# Patient Record
Sex: FEMALE | Race: WHITE | Employment: OTHER | URBAN - METROPOLITAN AREA
[De-identification: names, ages, dates, MRNs, and addresses within clinical notes are randomized per-mention and may not be internally consistent; named-entity substitution may affect disease eponyms.]

---

## 2018-10-12 ENCOUNTER — HOSPITAL ENCOUNTER (INPATIENT)
Facility: CLINIC | Age: 83
LOS: 1 days | Discharge: HOME | DRG: 065 | End: 2018-10-13
Attending: EMERGENCY MEDICINE | Admitting: INTERNAL MEDICINE
Payer: COMMERCIAL

## 2018-10-12 ENCOUNTER — APPOINTMENT (OUTPATIENT)
Dept: CT IMAGING | Facility: CLINIC | Age: 83
DRG: 065 | End: 2018-10-12
Attending: EMERGENCY MEDICINE
Payer: COMMERCIAL

## 2018-10-12 ENCOUNTER — APPOINTMENT (OUTPATIENT)
Dept: MRI IMAGING | Facility: CLINIC | Age: 83
DRG: 065 | End: 2018-10-12
Attending: NURSE PRACTITIONER
Payer: COMMERCIAL

## 2018-10-12 DIAGNOSIS — E78.2 MIXED HYPERLIPIDEMIA: ICD-10-CM

## 2018-10-12 DIAGNOSIS — E87.6 HYPOKALEMIA: ICD-10-CM

## 2018-10-12 DIAGNOSIS — I63.9 CEREBROVASCULAR ACCIDENT (CVA), UNSPECIFIED MECHANISM (H): ICD-10-CM

## 2018-10-12 DIAGNOSIS — N39.0 URINARY TRACT INFECTION WITHOUT HEMATURIA, SITE UNSPECIFIED: ICD-10-CM

## 2018-10-12 DIAGNOSIS — I63.9 ACUTE ISCHEMIC STROKE (H): Primary | ICD-10-CM

## 2018-10-12 DIAGNOSIS — N30.00 ACUTE CYSTITIS WITHOUT HEMATURIA: ICD-10-CM

## 2018-10-12 LAB
ALBUMIN SERPL-MCNC: 3.3 G/DL (ref 3.4–5)
ALBUMIN UR-MCNC: NEGATIVE MG/DL
ALP SERPL-CCNC: 47 U/L (ref 40–150)
ALT SERPL W P-5'-P-CCNC: 22 U/L (ref 0–50)
AMPHETAMINES UR QL SCN: NEGATIVE
ANION GAP SERPL CALCULATED.3IONS-SCNC: 6 MMOL/L (ref 3–14)
APPEARANCE UR: CLEAR
APTT PPP: 31 SEC (ref 22–37)
AST SERPL W P-5'-P-CCNC: 21 U/L (ref 0–45)
BACTERIA #/AREA URNS HPF: ABNORMAL /HPF
BARBITURATES UR QL: NEGATIVE
BASOPHILS # BLD AUTO: 0 10E9/L (ref 0–0.2)
BASOPHILS NFR BLD AUTO: 0.4 %
BENZODIAZ UR QL: POSITIVE
BILIRUB DIRECT SERPL-MCNC: 0.1 MG/DL (ref 0–0.2)
BILIRUB SERPL-MCNC: 0.5 MG/DL (ref 0.2–1.3)
BILIRUB UR QL STRIP: NEGATIVE
BUN SERPL-MCNC: 16 MG/DL (ref 7–30)
CALCIUM SERPL-MCNC: 8.6 MG/DL (ref 8.5–10.1)
CANNABINOIDS UR QL SCN: NEGATIVE
CHLORIDE SERPL-SCNC: 106 MMOL/L (ref 94–109)
CHOLEST SERPL-MCNC: 265 MG/DL
CO2 SERPL-SCNC: 29 MMOL/L (ref 20–32)
COCAINE UR QL: NEGATIVE
COLOR UR AUTO: ABNORMAL
CREAT SERPL-MCNC: 0.97 MG/DL (ref 0.52–1.04)
DIFFERENTIAL METHOD BLD: NORMAL
EOSINOPHIL # BLD AUTO: 0 10E9/L (ref 0–0.7)
EOSINOPHIL NFR BLD AUTO: 0.6 %
ERYTHROCYTE [DISTWIDTH] IN BLOOD BY AUTOMATED COUNT: 12.7 % (ref 10–15)
ERYTHROCYTE [SEDIMENTATION RATE] IN BLOOD BY WESTERGREN METHOD: 8 MM/H (ref 0–30)
ETHANOL SERPL-MCNC: <0.01 G/DL
GFR SERPL CREATININE-BSD FRML MDRD: 55 ML/MIN/1.7M2
GLUCOSE SERPL-MCNC: 96 MG/DL (ref 70–99)
GLUCOSE UR STRIP-MCNC: NEGATIVE MG/DL
HBA1C MFR BLD: 5.2 % (ref 0–5.6)
HCT VFR BLD AUTO: 37.3 % (ref 35–47)
HDLC SERPL-MCNC: 98 MG/DL
HGB BLD-MCNC: 12.5 G/DL (ref 11.7–15.7)
HGB UR QL STRIP: NEGATIVE
IMM GRANULOCYTES # BLD: 0 10E9/L (ref 0–0.4)
IMM GRANULOCYTES NFR BLD: 0.6 %
INR PPP: 0.92 (ref 0.86–1.14)
INTERPRETATION ECG - MUSE: NORMAL
KETONES UR STRIP-MCNC: NEGATIVE MG/DL
LDLC SERPL CALC-MCNC: 131 MG/DL
LEUKOCYTE ESTERASE UR QL STRIP: ABNORMAL
LYMPHOCYTES # BLD AUTO: 1.6 10E9/L (ref 0.8–5.3)
LYMPHOCYTES NFR BLD AUTO: 23 %
MCH RBC QN AUTO: 30.6 PG (ref 26.5–33)
MCHC RBC AUTO-ENTMCNC: 33.5 G/DL (ref 31.5–36.5)
MCV RBC AUTO: 91 FL (ref 78–100)
MONOCYTES # BLD AUTO: 0.6 10E9/L (ref 0–1.3)
MONOCYTES NFR BLD AUTO: 8.6 %
MUCOUS THREADS #/AREA URNS LPF: PRESENT /LPF
NEUTROPHILS # BLD AUTO: 4.5 10E9/L (ref 1.6–8.3)
NEUTROPHILS NFR BLD AUTO: 66.8 %
NITRATE UR QL: NEGATIVE
NONHDLC SERPL-MCNC: 167 MG/DL
NRBC # BLD AUTO: 0 10*3/UL
NRBC BLD AUTO-RTO: 0 /100
OPIATES UR QL SCN: NEGATIVE
PCP UR QL SCN: NEGATIVE
PH UR STRIP: 7 PH (ref 5–7)
PLATELET # BLD AUTO: 186 10E9/L (ref 150–450)
POTASSIUM SERPL-SCNC: 3.3 MMOL/L (ref 3.4–5.3)
PROT SERPL-MCNC: 6.8 G/DL (ref 6.8–8.8)
RBC # BLD AUTO: 4.09 10E12/L (ref 3.8–5.2)
RBC #/AREA URNS AUTO: 1 /HPF (ref 0–2)
SODIUM SERPL-SCNC: 141 MMOL/L (ref 133–144)
SOURCE: ABNORMAL
SP GR UR STRIP: 1.03 (ref 1–1.03)
SQUAMOUS #/AREA URNS AUTO: <1 /HPF (ref 0–1)
TRIGL SERPL-MCNC: 182 MG/DL
UROBILINOGEN UR STRIP-MCNC: NORMAL MG/DL (ref 0–2)
WBC # BLD AUTO: 6.8 10E9/L (ref 4–11)
WBC #/AREA URNS AUTO: 32 /HPF (ref 0–5)
WBC CLUMPS #/AREA URNS HPF: PRESENT /HPF

## 2018-10-12 PROCEDURE — 83036 HEMOGLOBIN GLYCOSYLATED A1C: CPT | Performed by: EMERGENCY MEDICINE

## 2018-10-12 PROCEDURE — 87186 SC STD MICRODIL/AGAR DIL: CPT | Performed by: INTERNAL MEDICINE

## 2018-10-12 PROCEDURE — 85025 COMPLETE CBC W/AUTO DIFF WBC: CPT | Performed by: EMERGENCY MEDICINE

## 2018-10-12 PROCEDURE — 87086 URINE CULTURE/COLONY COUNT: CPT | Performed by: INTERNAL MEDICINE

## 2018-10-12 PROCEDURE — 70496 CT ANGIOGRAPHY HEAD: CPT | Mod: XS

## 2018-10-12 PROCEDURE — 25000128 H RX IP 250 OP 636: Performed by: EMERGENCY MEDICINE

## 2018-10-12 PROCEDURE — 70450 CT HEAD/BRAIN W/O DYE: CPT

## 2018-10-12 PROCEDURE — 25000125 ZZHC RX 250: Performed by: EMERGENCY MEDICINE

## 2018-10-12 PROCEDURE — 99285 EMERGENCY DEPT VISIT HI MDM: CPT | Mod: 25

## 2018-10-12 PROCEDURE — 80320 DRUG SCREEN QUANTALCOHOLS: CPT | Performed by: EMERGENCY MEDICINE

## 2018-10-12 PROCEDURE — 93005 ELECTROCARDIOGRAM TRACING: CPT

## 2018-10-12 PROCEDURE — 85610 PROTHROMBIN TIME: CPT | Performed by: EMERGENCY MEDICINE

## 2018-10-12 PROCEDURE — 99223 1ST HOSP IP/OBS HIGH 75: CPT | Mod: AI | Performed by: INTERNAL MEDICINE

## 2018-10-12 PROCEDURE — 80061 LIPID PANEL: CPT | Performed by: EMERGENCY MEDICINE

## 2018-10-12 PROCEDURE — 80048 BASIC METABOLIC PNL TOTAL CA: CPT | Performed by: EMERGENCY MEDICINE

## 2018-10-12 PROCEDURE — 87088 URINE BACTERIA CULTURE: CPT | Performed by: INTERNAL MEDICINE

## 2018-10-12 PROCEDURE — 40000916 ZZH STATISTIC SITTER, NIGHT HOURS

## 2018-10-12 PROCEDURE — 85730 THROMBOPLASTIN TIME PARTIAL: CPT | Performed by: EMERGENCY MEDICINE

## 2018-10-12 PROCEDURE — 70551 MRI BRAIN STEM W/O DYE: CPT

## 2018-10-12 PROCEDURE — 96375 TX/PRO/DX INJ NEW DRUG ADDON: CPT

## 2018-10-12 PROCEDURE — 12000007 ZZH R&B INTERMEDIATE

## 2018-10-12 PROCEDURE — 25000128 H RX IP 250 OP 636: Performed by: INTERNAL MEDICINE

## 2018-10-12 PROCEDURE — 25000128 H RX IP 250 OP 636

## 2018-10-12 PROCEDURE — 96361 HYDRATE IV INFUSION ADD-ON: CPT

## 2018-10-12 PROCEDURE — 70498 CT ANGIOGRAPHY NECK: CPT

## 2018-10-12 PROCEDURE — 80076 HEPATIC FUNCTION PANEL: CPT | Performed by: EMERGENCY MEDICINE

## 2018-10-12 PROCEDURE — 96374 THER/PROPH/DIAG INJ IV PUSH: CPT

## 2018-10-12 PROCEDURE — 80307 DRUG TEST PRSMV CHEM ANLYZR: CPT | Performed by: EMERGENCY MEDICINE

## 2018-10-12 PROCEDURE — 81001 URINALYSIS AUTO W/SCOPE: CPT | Performed by: EMERGENCY MEDICINE

## 2018-10-12 PROCEDURE — 85652 RBC SED RATE AUTOMATED: CPT | Performed by: EMERGENCY MEDICINE

## 2018-10-12 PROCEDURE — 40000915 ZZH STATISTIC SITTER, EVENING HOURS

## 2018-10-12 PROCEDURE — 99222 1ST HOSP IP/OBS MODERATE 55: CPT | Performed by: PSYCHIATRY & NEUROLOGY

## 2018-10-12 RX ORDER — BUDESONIDE 3 MG/1
9 CAPSULE, COATED PELLETS ORAL EVERY MORNING
COMMUNITY

## 2018-10-12 RX ORDER — LORAZEPAM 2 MG/ML
INJECTION INTRAMUSCULAR
Status: COMPLETED
Start: 2018-10-12 | End: 2018-10-12

## 2018-10-12 RX ORDER — LABETALOL HYDROCHLORIDE 5 MG/ML
10-40 INJECTION, SOLUTION INTRAVENOUS EVERY 10 MIN PRN
Status: DISCONTINUED | OUTPATIENT
Start: 2018-10-12 | End: 2018-10-13 | Stop reason: HOSPADM

## 2018-10-12 RX ORDER — PROCHLORPERAZINE MALEATE 5 MG
5 TABLET ORAL EVERY 6 HOURS PRN
Status: DISCONTINUED | OUTPATIENT
Start: 2018-10-12 | End: 2018-10-13 | Stop reason: HOSPADM

## 2018-10-12 RX ORDER — POTASSIUM CL/LIDO/0.9 % NACL 10MEQ/0.1L
10 INTRAVENOUS SOLUTION, PIGGYBACK (ML) INTRAVENOUS
Status: DISCONTINUED | OUTPATIENT
Start: 2018-10-12 | End: 2018-10-13 | Stop reason: HOSPADM

## 2018-10-12 RX ORDER — HYDRALAZINE HYDROCHLORIDE 20 MG/ML
10-20 INJECTION INTRAMUSCULAR; INTRAVENOUS
Status: DISCONTINUED | OUTPATIENT
Start: 2018-10-12 | End: 2018-10-13 | Stop reason: HOSPADM

## 2018-10-12 RX ORDER — ONDANSETRON 4 MG/1
4 TABLET, ORALLY DISINTEGRATING ORAL EVERY 6 HOURS PRN
Status: DISCONTINUED | OUTPATIENT
Start: 2018-10-12 | End: 2018-10-13 | Stop reason: HOSPADM

## 2018-10-12 RX ORDER — LORAZEPAM 2 MG/ML
1 INJECTION INTRAMUSCULAR ONCE
Status: COMPLETED | OUTPATIENT
Start: 2018-10-12 | End: 2018-10-12

## 2018-10-12 RX ORDER — POTASSIUM CHLORIDE 29.8 MG/ML
20 INJECTION INTRAVENOUS
Status: DISCONTINUED | OUTPATIENT
Start: 2018-10-12 | End: 2018-10-13 | Stop reason: HOSPADM

## 2018-10-12 RX ORDER — IOPAMIDOL 755 MG/ML
120 INJECTION, SOLUTION INTRAVASCULAR ONCE
Status: COMPLETED | OUTPATIENT
Start: 2018-10-12 | End: 2018-10-12

## 2018-10-12 RX ORDER — POTASSIUM CHLORIDE 1.5 G/1.58G
20-40 POWDER, FOR SOLUTION ORAL
Status: DISCONTINUED | OUTPATIENT
Start: 2018-10-12 | End: 2018-10-13 | Stop reason: HOSPADM

## 2018-10-12 RX ORDER — ACETAMINOPHEN 325 MG/1
650 TABLET ORAL EVERY 4 HOURS PRN
Status: DISCONTINUED | OUTPATIENT
Start: 2018-10-12 | End: 2018-10-13 | Stop reason: HOSPADM

## 2018-10-12 RX ORDER — POTASSIUM CHLORIDE 7.45 MG/ML
10 INJECTION INTRAVENOUS
Status: DISCONTINUED | OUTPATIENT
Start: 2018-10-12 | End: 2018-10-13 | Stop reason: HOSPADM

## 2018-10-12 RX ORDER — AMOXICILLIN 250 MG
1 CAPSULE ORAL 2 TIMES DAILY PRN
Status: DISCONTINUED | OUTPATIENT
Start: 2018-10-12 | End: 2018-10-13 | Stop reason: HOSPADM

## 2018-10-12 RX ORDER — NALOXONE HYDROCHLORIDE 0.4 MG/ML
.1-.4 INJECTION, SOLUTION INTRAMUSCULAR; INTRAVENOUS; SUBCUTANEOUS
Status: DISCONTINUED | OUTPATIENT
Start: 2018-10-12 | End: 2018-10-13 | Stop reason: HOSPADM

## 2018-10-12 RX ORDER — SODIUM CHLORIDE 9 MG/ML
INJECTION, SOLUTION INTRAVENOUS CONTINUOUS
Status: DISCONTINUED | OUTPATIENT
Start: 2018-10-12 | End: 2018-10-13

## 2018-10-12 RX ORDER — SODIUM CHLORIDE 9 MG/ML
1000 INJECTION, SOLUTION INTRAVENOUS CONTINUOUS
Status: DISCONTINUED | OUTPATIENT
Start: 2018-10-12 | End: 2018-10-12

## 2018-10-12 RX ORDER — CEFTRIAXONE 1 G/1
1 INJECTION, POWDER, FOR SOLUTION INTRAMUSCULAR; INTRAVENOUS EVERY 24 HOURS
Status: DISCONTINUED | OUTPATIENT
Start: 2018-10-13 | End: 2018-10-13 | Stop reason: HOSPADM

## 2018-10-12 RX ORDER — POTASSIUM CHLORIDE 1500 MG/1
20-40 TABLET, EXTENDED RELEASE ORAL
Status: DISCONTINUED | OUTPATIENT
Start: 2018-10-12 | End: 2018-10-13 | Stop reason: HOSPADM

## 2018-10-12 RX ORDER — CEFTRIAXONE 1 G/1
1 INJECTION, POWDER, FOR SOLUTION INTRAMUSCULAR; INTRAVENOUS ONCE
Status: COMPLETED | OUTPATIENT
Start: 2018-10-12 | End: 2018-10-12

## 2018-10-12 RX ORDER — ALPRAZOLAM 0.25 MG
0.25 TABLET ORAL 2 TIMES DAILY PRN
Status: ON HOLD | COMMUNITY
End: 2018-10-13

## 2018-10-12 RX ORDER — ONDANSETRON 2 MG/ML
4 INJECTION INTRAMUSCULAR; INTRAVENOUS EVERY 6 HOURS PRN
Status: DISCONTINUED | OUTPATIENT
Start: 2018-10-12 | End: 2018-10-13 | Stop reason: HOSPADM

## 2018-10-12 RX ORDER — PROCHLORPERAZINE 25 MG
12.5 SUPPOSITORY, RECTAL RECTAL EVERY 12 HOURS PRN
Status: DISCONTINUED | OUTPATIENT
Start: 2018-10-12 | End: 2018-10-13 | Stop reason: HOSPADM

## 2018-10-12 RX ORDER — ROSUVASTATIN CALCIUM 20 MG/1
20 TABLET, COATED ORAL DAILY
Status: DISCONTINUED | OUTPATIENT
Start: 2018-10-12 | End: 2018-10-13 | Stop reason: HOSPADM

## 2018-10-12 RX ORDER — AMOXICILLIN 250 MG
2 CAPSULE ORAL 2 TIMES DAILY PRN
Status: DISCONTINUED | OUTPATIENT
Start: 2018-10-12 | End: 2018-10-13 | Stop reason: HOSPADM

## 2018-10-12 RX ADMIN — SODIUM CHLORIDE 1000 ML: 9 INJECTION, SOLUTION INTRAVENOUS at 11:40

## 2018-10-12 RX ADMIN — LORAZEPAM 1 MG: 2 INJECTION INTRAMUSCULAR at 12:14

## 2018-10-12 RX ADMIN — IOPAMIDOL 120 ML: 755 INJECTION, SOLUTION INTRAVENOUS at 10:55

## 2018-10-12 RX ADMIN — CEFTRIAXONE 1 G: 1 INJECTION, POWDER, FOR SOLUTION INTRAMUSCULAR; INTRAVENOUS at 13:11

## 2018-10-12 RX ADMIN — SODIUM CHLORIDE, PRESERVATIVE FREE 100 ML: 5 INJECTION INTRAVENOUS at 10:55

## 2018-10-12 RX ADMIN — LORAZEPAM 1 MG: 2 INJECTION INTRAMUSCULAR; INTRAVENOUS at 11:52

## 2018-10-12 RX ADMIN — LORAZEPAM 1 MG: 2 INJECTION INTRAMUSCULAR at 11:52

## 2018-10-12 RX ADMIN — SODIUM CHLORIDE: 9 INJECTION, SOLUTION INTRAVENOUS at 15:18

## 2018-10-12 RX ADMIN — LORAZEPAM 1 MG: 2 INJECTION INTRAMUSCULAR; INTRAVENOUS at 12:14

## 2018-10-12 ASSESSMENT — ACTIVITIES OF DAILY LIVING (ADL)
BATHING: 0-->INDEPENDENT
AMBULATION: 0-->INDEPENDENT
COGNITION: 0 - NO COGNITION ISSUES REPORTED
FALL_HISTORY_WITHIN_LAST_SIX_MONTHS: YES
WHICH_OF_THE_ABOVE_FUNCTIONAL_RISKS_HAD_A_RECENT_ONSET_OR_CHANGE?: COMMUNICATION/SPEECH;COGNITION
TOILETING: 0-->INDEPENDENT
NUMBER_OF_TIMES_PATIENT_HAS_FALLEN_WITHIN_LAST_SIX_MONTHS: 1
TRANSFERRING: 0-->INDEPENDENT
SWALLOWING: 0-->SWALLOWS FOODS/LIQUIDS WITHOUT DIFFICULTY
RETIRED_COMMUNICATION: 2-->DIFFICULTY UNDERSTANDING (NOT RELATED TO LANGUAGE BARRIER)
ADLS_ACUITY_SCORE: 17
ADLS_ACUITY_SCORE: 13
DRESS: 0-->INDEPENDENT
RETIRED_EATING: 0-->INDEPENDENT

## 2018-10-12 ASSESSMENT — ENCOUNTER SYMPTOMS
ABDOMINAL PAIN: 0
CONFUSION: 1
SPEECH DIFFICULTY: 1
HEADACHES: 0

## 2018-10-12 NOTE — H&P
Admitted:     10/12/2018      PRIMARY CARE PHYSICIAN:  Dr. Bartolo Dawkins.      CHIEF COMPLAINT:  Abnormal speech.      HISTORY OF PRESENT ILLNESS:  Ms. Huong Lopez is a delightful 85-year-old  lady with a past medical history notable for hypertension, dyslipidemia, depression/anxiety, and sensorineural hearing loss, who has presented with abnormal speech.  Reportedly, her daughter last spoke to her at 1600 hours yesterday and called the patient again this morning noting that she had slurred speech.  Daughter called 911.  Upon arrival, EMS noted slurred speech and confusion.  There was no focal weakness on examination.  EMS also found a bottle of alprazolam in the patient's kitchen from 09/04/2018, ordered twice daily as needed, and a 60 quantity.  She was subsequently transported to the Emergency Department at [facility].  In the ER, the patient has incoherent speech and not able to follow any commands.  She appears to be confused.  There is no report of fever, chills, headache, double vision, blurry vision or other complaints.  In the Emergency Department, the patient has been evaluated by Dr. Camden Tucker, the ER attending physician.  The electrocardiogram reveals normal sinus rhythm, a rate of 87 beats per minute, first-degree AV block, prolonged QTc of 510 and poor R-wave progression in anterior leads.  CT head without contrast shows diffuse cerebral volume loss and cerebral white matter changes consistent with chronic small vessel ischemic disease.  CT with contrast is a normal CT perfusion of the brain.  CT angiography of the head and neck shows minimal calcified atherosclerotic plaque of the proximal and distal internal carotid arteries on both sides that does not result in any stenosis.  Chemistry profile is significant for a slightly low potassium level of 3.3.  Blood glucose is 96 and normal. She has a normal white count of 6.8 and hemoglobin of 12.5 on hematology profile.   Urinalysis is abnormal showing moderate leukocyte esterase, 32 WBCs and a few bacteria.  U-tox is positive for benzodiazepines.  Code stroke was activated in the Emergency Department and the patient was evaluated by Neurology Service.  The patient is having rapid MRI done before admission to the hospital to decide whether she would benefit from tPA treatment.  The Hospitalist Service was called for admission.      PAST MEDICAL HISTORY:   1.  Hypertension.   2.  Dyslipidemia.   3.  Depression/anxiety.   4.  Diverticulosis.   5.  Osteopenia.   6.  Sensorineural hearing loss.   7.  Alcohol abuse, in remission.      PAST SURGICAL HISTORY:   1.  Total abdominal hysterectomy in 1985.   2.  Appendectomy in 1937.   3.  Retinal detachment repair in 12/2012.   4.  Cataract removal in 10/2013.      FAMILY HISTORY:  Significant for coronary artery disease and diabetes in her father, stroke and coronary disease in her brother, coronary artery disease, colon and stomach cancers in her sisters.     SOCIAL HISTORY:  The patient is .  She lives independently.  She does not use any assistive device for ambulation.  She is not a smoker.  She drinks alcohol on occasions.      MEDICATIONS:     Prior to Admission Medications   Prescriptions Last Dose Informant Patient Reported? Taking?   ALPRAZolam (XANAX) 0.25 MG tablet   Yes No   Sig: Take 0.25 mg by mouth 2 times daily as needed for anxiety   LISINOPRIL PO   Yes No   Sig: Take 20 mg by mouth daily    calcium carbonate (OS-FELA 500 MG [place]. [place]) 500 MG tablet   Yes No   Sig: Take 500 mg by mouth 2 times daily.   multivitamin, therapeutic with minerals (MULTI-VITAMIN) TABS   Yes No   Sig: Take 1 tablet by mouth daily.      Facility-Administered Medications: None        ALLERGIES AND  INTOLERANCES:  AUGMENTIN AND SULFA DRUGS WITH A REACTION OF HIVES.      REVIEW OF SYSTEMS:  A 10-point review of system was quite limited.  The patient has incoherent speech and confusion.  It was  reviewed as much as possible in the history of present illness.      PHYSICAL EXAMINATION:   GENERAL:  This patient is a very pleasant elderly lady who is in no acute distress.   VITAL SIGNS:  Blood pressure 123/88, heart rate 92, respiratory rate 22, temperature 98.1 degrees Fahrenheit, oxygen saturation 95% on room air.   HEENT:  The pupils are round, equal, and reactive to light bilaterally.  There is no conjunctival pallor or scleral icterus.  The oral mucosa appears moist.   NECK:  Supple.  There is no elevated JVP.   LUNGS:  Clear to auscultation bilaterally.   CARDIOVASCULAR:  There is a normal S1 and S2 with a regular rate and rhythm.   ABDOMEN:  Soft, nontender, nondistended.  Bowel sounds are present.   EXTREMITIES:  There is no calf tenderness or lower extremity edema.   SKIN:  There is no jaundice, cyanosis or acute rash.   NEUROLOGIC:  She is awake, but not alert or oriented.  Her speech is garbled and incoherent.  She is able to move her 4 extremities.  There is no facial droop.  She is not able to follow commands.  The gait was not tested.      LABORATORY DATA:   1.  Chemistry profile:  Sodium 141, potassium 3.3, BUN 16, creatinine 0.95, calcium 8.6, glucose 96.   2.  Hematology profile:  White count 6.8, hemoglobin 12.5, platelet count 186,000, MCV 91 with differential of 66.8% neutrophils.   3.  Coagulation profile:  INR 0.92, PTT 31.      ASSESSMENT AND PLAN:  Ms. Huong Lopez is a delightful 85-year-old  lady with a past medical history notable for hypertension, dyslipidemia, depression/anxiety, osteopenia, sensorineural hearing loss and alcohol abuse in remission, who has presented with abnormal speech and confusion.      1.  Acute ischemic stroke versus acute toxic encephalopathy due to benzodiazepine overdose:  Her daughter last spoke to her at 1600 hours yesterday and called the patient again this morning noting slurred speech.  Daughter called 911.  Upon arrival, EMS noted slurred  speech and confusion.  EMS also found a bottle of alprazolam from 09/04/2018, ordered twice a day as needed.  The workup in the ER shows a U-tox positive for benzodiazepines.  CT head with and without contrast and CT angio of the head and neck are essentially unremarkable.  Electrocardiogram reveals normal sinus rhythm, first-degree AV block, prolonged QTc of 510 and poor R-wave progression in anterior leads.  Chemistry and hematology profile are essentially unremarkable.  Code stroke was activated in the Emergency Department and the patient has been evaluated by the Neurology Service.  Plan as below.    A.  Rapid MRI to determine whether patient would benefit from tPA.    B.  N.p.o.   C.  IV fluid at a rate of 100 mL per hour.   D.  Neuro checks every 4 hours and p.r.n.   E.  Echocardiogram with bubble study.   F.  Telemetry.    G.  PT/OT/SLP evaluation.     H.  Fasting lipid profile and liver function panel.   I.  Permissive hypertension.   J.  If patient is not qualified for tPA therapy, will start aspirin.     K.  Fall precautions.    L.  Further management per Neurology Service.   2.  Hypokalemia.  Potassium is slightly low at 3.3, which we will replace per protocol.   3.  Hypertension:  The blood pressure is currently controlled.  At home, she is on lisinopril at 5 mg p.o. daily.  This will be held to allow for permissive hypertension in view of possible acute stroke.  I will have IV labetalol and hydralazine available p.r.n.   4.  Abnormal urinalysis:  Urinalysis shows clear urine, but moderate leukocyte esterase, 32 WBCs and a few bacteria.  This could be due to contamination.  However, she will be treated with IV ceftriaxone until the results of the urine culture is available.   5.  Depression/anxiety:  Her prior to admission alprazolam will be held.  6.  Deep vein thrombosis prophylaxis:  Pneumatic compression device.   7.  Code status:  Per Epic, the patient is DNR; however, the daughter who is with the  patient is not certain about the code status as she indicates that her sister is the durable power of .  I tried to reach the other daughter, but she is not available.  For now, I will keep the patient full code until further discuss the code status.   8.  Disposition:  Anticipate 2-3 days of inpatient management.      I would like to thank Dr. Bartolo Dawkins for allowing the Hospitalist Service to participate in the care of this patient.         FEDERICA REYNOSO MD             D: 10/12/2018   T: 10/12/2018   MT: NTS      Name:     JULIO ZAMORA   MRN:      [id number]        Account:      [id number]   :      1933        Admitted:     10/12/2018                   Document: [id number]       cc: Bartolo Dawkins MD

## 2018-10-12 NOTE — ED NOTES
Bed: ED04  Expected date:   Expected time:   Means of arrival:   Comments:  melva Joe poss stroke eta 1039

## 2018-10-12 NOTE — IP AVS SNAPSHOT
[facility]    [address]    [place] [address]    Phone:  [phone]                                       After Visit Summary   10/12/2018    Huong Lopez    MRN: [id number]           After Visit Summary Signature Page     I have received my discharge instructions, and my questions have been answered. I have discussed any challenges I see with this plan with the nurse or doctor.    ..........................................................................................................................................  Patient/Patient Representative Signature      ..........................................................................................................................................  Patient Representative Print Name and Relationship to Patient    ..................................................               ................................................  Date                                   Time    ..........................................................................................................................................  Reviewed by Signature/Title    ...................................................              ..............................................  Date                                               Time          22EPIC Rev 08/18

## 2018-10-12 NOTE — PROGRESS NOTES
RECEIVING UNIT ED HANDOFF REVIEW    ED Nurse Handoff Report was reviewed by: Traci Asencio on October 12, 2018 at 1:04 PM

## 2018-10-12 NOTE — CONSULTS
"[facility]      Stroke Consult Note    HPI  Huong Lopez is a 85 year old female who presented with difficulty with speech. Her daughter last spoke to her at 1600 yesterday, and called patient again this morning noting slurred speech. Daughter called 911. Upon arrival, EMS noted slurred speech and confusion. Movements were equal bilaterally. EMS also found a bottle of alprazolam from 9/4/18, ordered twice daily as needed. EMS put transfer on hold due to patient not wanting to go to the ED. Patient reported feeling sad over the past few weeks. She ultimately came to the ED and stroke code was called. CT/CTA/CTP negative for acute abnormality. Time of onset is unclear, and it is also unclear whether patient took the alprazolam recently or as ordered from the bottle.   UPDATE: daughter present at bedside now, provides more information for the timeline. Patient is an unreliable historian, answering \"yes\" to all questions. Unreliable if she actually had breakfast at 0600 without symptoms. Patient is in denial of symptoms and keeps asking to leave. Obtained stat MRI brain to evaluate for possibility of intervention, but no acute stroke is seen. Will admit for further evaluation and workup. Patient is very agitated and doesn't understand the situation.    Reason for Consult:  Stroke Code     Stroke code activated 10/12/18   1041   First stroke provider response  10/12/18 1045       Last known normal 10/11/18   1600   Time of discovery (or onset of symptoms) 10/12/18  0600     Head CT read by me 10/12/18   1105   Was stroke code de-escalated?  yes at 1122 due to being outside window for intervention       TPA Treatment   Not given due to outside the time window.    Endovascular Treatment  Not initiated due to absence of proximal vessel occlusion    Stroke Scales:     NIHSS  Interval and Comments baseline   1a. Level of Consciousness 0-->Alert: keenly responsive   1b. LOC Questions 2-->Answers neither " question correctly   1c. LOC Commands 2-->Performs neither task correctly   2.   Best Gaze 0-->Normal   3.   Visual 0-->No visual loss   4.   Facial Palsy 0-->Normal symmetrical movements   5a. Motor Arm, Left 0-->No drift: limb holds 90 (or 45) degrees for full 10 secs   5b. Motor Arm, Right 0-->No drift: limb holds 90 (or 45) degrees for full 10 secs   6a. Motor Leg, Left 0-->No drift: leg holds 30 degree position for full 5 secs   6b. Motor Leg, right 0-->No drift: leg holds 30 degree position for full 5 secs   7.   Limb Ataxia 0-->Absent   8.   Sensory 0-->Normal: no sensory loss   9.   Best Language 1-->Mild-to-moderate aphasia: some obvious loss of fluency or facility of comprehension, without significant limitation on ideas expressed or form of expression. Reduction of speech and/or comprehension, however, makes conversation. . . (see row details)   10. Dysarthria 1-->Mild-to-moderate dysarthria: patient slurs at least some words and, at worst, can be understood with some difficulty   11. Extinction and Inattention  0-->No abnormality   Total 6             Code Status:  No Order    Impression:  Possible stroke vs alprazolam effect    Recommendations:  Acute Ischemic Stroke (without tPA) Recommendations  - Neurochecks  - Permissive HTN; labetalol PRN for SBP > 220  - Euthermia, Euglycemia  - Daily aspirin for secondary stroke prevention  - Statin  - TTE with Bubble Study  - Telemetry, EKG  - Bedside Glucose Monitoring  - A1c, Lipid Panel, Troponin x 3  - PT/OT/SLP  - PM&R  - Stroke Education  - urine tox screen    Patient Follow-up:    - final recommendation pending work-up    Please contact the Stroke Service with any questions.    __________________________________________________    Past Medical History:   Diagnosis Date     Hypertension        No past surgical history on file.    Medications   Current Facility-Administered Medications   Medication     0.9% sodium chloride BOLUS    Followed by     sodium  chloride 0.9% infusion     Current Outpatient Prescriptions   Medication Sig     acetaminophen-codeine (TYLENOL #3) 300-30 MG per tablet Take 1-2 tablets by mouth every 4 hours as needed for pain. Maximum of 4000 mg of acetaminophen in 24 hours.     ASPIRIN PO Take 325 mg by mouth.     calcium carbonate (OS-FLEA 500 MG [place]. [place]) 500 MG tablet Take 500 mg by mouth 2 times daily.     LISINOPRIL PO Take 5 mg by mouth.     moxifloxacin (VIGAMOX) 0.5 % ophthalmic solution Apply 1 drop to eye 4 times daily. Instill into operative eye(s) per physician instructions     multivitamin, therapeutic with minerals (MULTI-VITAMIN) TABS Take 1 tablet by mouth daily.     prednisoLONE acetate (PRED FORTE) 1 % ophthalmic suspension Apply 1 drop to eye 4 times daily. Instill into operative eye(s) per physician instructions.     Venlafaxine HCl (EFFEXOR PO) Take 25 mg by mouth 3 times daily.         Allergies   Allergies   Allergen Reactions     Augmentin Hives     Sulfa Drugs Hives       Family History       Social History   Social History     Social History     Marital status:      Spouse name: N/A     Number of children: N/A     Years of education: N/A     Social History Main Topics     Smoking status: Never Smoker     Smokeless tobacco: Not on file     Alcohol use Yes      Comment: one glass wine 2x week     Drug use: No     Sexual activity: Not on file     Other Topics Concern     Not on file     Social History Narrative     No narrative on file          ROS:  Review of systems not obtained due to patient factors - aphasia    PHYSICAL EXAMINATION:  B/P:     195/83 (10/12/18 1041)    Heart Rate: 83 (10/12/18 1041)        Resp:  20 (10/12/18 1041)  Temp: 98.1  F (36.7  C)   Oral (10/12/18 1041)    Neuro:       Mental Status Exam:   Awake, alert, unable to assess orientation. Mild dysarthria, receptive aphasia. Mental status is normal       Cranial Nerves:  Pupils 3 mm, reactive. EOM grossly intact.  Face is symmetric. Tongue  and uvula are midline. Other CN are normal           Motor:  Moves all extremities, equal , but difficulty following commands. Tone and bulk are normal           Reflexes:  Normal DTR. Toes equivocal.        Sensory:  Unable to assess, grossly intact             Coordination:   Grossly intact finger-to-nose        Gait:  No significant difficulties    Labs/Imaging:  Labs and imaging were reviewed and used in developing plan; pertinent results included.  Data   CBC  WBC (10e9/L)   Date Value   10/12/2018 6.8    RBC Count (10e12/L)   Date Value   10/12/2018 4.09    Hemoglobin (g/dL)   Date Value   10/12/2018 12.5      Hematocrit (%)   Date Value   10/12/2018 37.3    Platelet Count (10e9/L)   Date Value   10/12/2018 186         BMP  Sodium (mmol/L)   Date Value   10/12/2018 141    Potassium (mmol/L)   Date Value   10/12/2018 3.3 (L)    Chloride (mmol/L)   Date Value   10/12/2018 106      Carbon Dioxide (mmol/L)   Date Value   10/12/2018 29    Glucose (mg/dL)   Date Value   10/12/2018 96    Urea Nitrogen (mg/dL)   Date Value   10/12/2018 16      Creatinine (mg/dL)   Date Value   10/12/2018 0.97    Calcium (mg/dL)   Date Value   10/12/2018 8.6         INR Troponin I A1C   INR (no units)   Date Value   10/12/2018 0.92    No results found for: TROPI No results found for: A1C     Liver Panel  No results found for: PROTTOTAL No results found for: ALBUMIN No results found for: BILITOTAL   No results found for: ALKPHOS No results found for: AST No results found for: ALT   No results found for: BILIDIRECT       Lipid Profile  No results found for: CHOL No results found for: HDL No results found for: LDL   No results found for: TRIG No results found for: CHOLHDLRATIO           I spent 60 minutes with > 50% of time spent in coordination of care and counseling.  I personally reviewed all relevant labs and neuroimaging.        Text Page    Nancy Decker, MSN, FNP-BC, RN CNRN SCRN

## 2018-10-12 NOTE — IP AVS SNAPSHOT
MRN:[id number]                      After Visit Summary   10/12/2018    Huong Lopez    MRN: [id number]           Thank you!     Thank you for choosing [facility] for your care. Our goal is always to provide you with excellent care. Hearing back from our patients is one way we can continue to improve our services. Please take a few minutes to complete the written survey that you may receive in the mail after you visit with us. Thank you!        Patient Information     Date Of Birth          9/1/1933        Designated Caregiver       Most Recent Value    Caregiver    Will someone help with your care after discharge? yes    Name of designated caregiver facility - [facility]    Phone number of caregiver see facesheet    Caregiver address See facesheet      About your hospital stay     You were admitted on:  October 12, 2018 You last received care in the:  [facility] [address] [facility]    You were discharged on:  October 13, 2018        Reason for your hospital stay       Admitted for acute confusion and some speech changes.  Also may have an urinary tract infection.                  Who to Call     For medical emergencies, please call 911.  For non-urgent questions about your medical care, please call your primary care provider or clinic, [phone]          Attending Provider     Provider Specialty    Camden Tucker MD Emergency Medicine    ArbabiSandrita MD Internal Medicine       Primary Care Provider Office Phone # Fax #    Bartolo Dawkins [phone]      After Care Instructions     Diet       Follow this diet upon discharge: Orders Placed This Encounter      Combination Diet Regular Diet Adult; Thin Liquids (water, ice chips, juice, milk, gelatin, ice cream, etc)                  Follow-up Appointments     Follow-up and recommended labs and tests        Follow up with primary care provider, Bartolo Dawkins, within 7 days for hospital follow- up.  The  "following labs/tests are recommended: potassium.  Primary care physician also to follow-up on urine culture which is pending at the time of discharge.  Recommend that the patient get cognitive testing to be done as an outpatient to evaluate for mild dementia.                  Pending Results     Date and Time Order Name Status Description    10/12/2018 1416 EKG 12-lead, tracing only Preliminary     10/12/2018 1126 Urine Culture Aerobic Bacterial Preliminary             Statement of Approval     Ordered          10/13/18 2405  I have reviewed and agree with all the recommendations and orders detailed in this document.  EFFECTIVE NOW     Approved and electronically signed by:  Ina Shetty MD             Admission Information     Date & Time Provider Department Dept. Phone    10/12/2018 Sandrita Garcia MD [address] [facility] [phone]      Your Vitals Were     Blood Pressure Temperature Respirations Height Weight Pulse Oximetry    147/70 (BP Location: Left arm) 97.7  F (36.5  C) (Oral) 18 1.651 m (5' 5\") 59 kg (130 lb) 96%    BMI (Body Mass Index)                   21.63 kg/m2           [employer] Information     [employer] lets you send messages to your doctor, view your test results, renew your prescriptions, schedule appointments and more. To sign up, go to [email or web address] . Click on \"Log in\" on the left side of the screen, which will take you to the Welcome page. Then click on \"Sign up Now\" on the right side of the page.     You will be asked to enter the access code listed below, as well as some personal information. Please follow the directions to create your username and password.     Your access code is: [email or web address]  Expires: 2019  4:42 PM     Your access code will  in 90 days. If you need help or a new code, please call your [facility] clinic or [phone].        Care EveryWhere ID     This is your Care EveryWhere ID. This could be used by other " organizations to access your [facility] medical records  [id number]        Equal Access to Services     RENETTA CEJA : Hadii wiliam Cuenca, ankita alvarez, kaci tyson. So [facility] [place] [phone].    ATENCIÓN: Si habla español, tiene a tate disposición servicios gratuitos de asistencia lingüística. Llame al [phone].    We comply with applicable federal civil rights laws and [place] laws. We do not discriminate on the basis of race, color, national origin, age, disability, sex, sexual orientation, or gender identity.               Review of your medicines      START taking        Dose / Directions    aspirin 81 MG EC tablet        Dose:  81 mg   Start taking on:  10/14/2018   Take 1 tablet (81 mg) by mouth daily   Quantity:  30 tablet   Refills:  1       ciprofloxacin 250 MG tablet   Commonly known as:  CIPRO   Used for:  Acute cystitis without hematuria        Dose:  250 mg   Start taking on:  10/14/2018   Take 1 tablet (250 mg) by mouth 2 times daily for 2 days   Quantity:  4 tablet   Refills:  0       potassium chloride SA 10 MEQ CR tablet   Commonly known as:  K-DUR/KLOR-CON M   Used for:  Hypokalemia        Dose:  10 mEq   Take 1 tablet (10 mEq) by mouth daily   Quantity:  30 tablet   Refills:  0       rosuvastatin 20 MG tablet   Commonly known as:  CRESTOR   Used for:  Mixed hyperlipidemia        Dose:  20 mg   Start taking on:  10/14/2018   1 tablet (20 mg) by Oral or NG Tube route daily   Quantity:  30 tablet   Refills:  1         CONTINUE these medicines which have NOT CHANGED        Dose / Directions    budesonide 3 MG EC capsule   Commonly known as:  ENTOCORT EC        Dose:  9 mg   Take 9 mg by mouth every morning   Refills:  0       calcium carbonate 500 mg (elemental) 500 MG tablet   Commonly known as:  OS-FELA        Dose:  500 mg   Take 500 mg by mouth 2 times daily.   Refills:  0       LISINOPRIL PO        Dose:  20 mg   Take 20 mg by  mouth daily   Refills:  0       Multi-vitamin Tabs tablet        Dose:  1 tablet   Take 1 tablet by mouth daily.   Refills:  0         STOP taking     ALPRAZolam 0.25 MG tablet   Commonly known as:  XANAX                Where to get your medicines      These medications were sent to [facility] [id number]  [place], [place] - [address] AT [facility] [address] [address]  [facility] [place] [address]     Phone:  [phone]     aspirin 81 MG EC tablet    ciprofloxacin 250 MG tablet    potassium chloride SA 10 MEQ CR tablet    rosuvastatin 20 MG tablet                Protect others around you: Learn how to safely use, store and throw away your medicines at www.disposemymeds.org.        ANTIBIOTIC INSTRUCTION     You've Been Prescribed an Antibiotic - Now What?  Your healthcare team thinks that you or your loved one might have an infection. Some infections can be treated with antibiotics, which are powerful, life-saving drugs. Like all medications, antibiotics have side effects and should only be used when necessary. There are some important things you should know about your antibiotic treatment.      Your healthcare team may run tests before you start taking an antibiotic.    Your team may take samples (e.g., from your blood, urine or other areas) to run tests to look for bacteria. These test can be important to determine if you need an antibiotic at all and, if you do, which antibiotic will work best.      Within a few days, your healthcare team might change or even stop your antibiotic.    Your team may start you on an antibiotic while they are working to find out what is making you sick.    Your team might change your antibiotic because test results show that a different antibiotic would be better to treat your infection.    In some cases, once your team has more information, they learn that you do not need an antibiotic at all. They may find out that you don't have an infection, or that the antibiotic  you're taking won't work against your infection. For example, an infection caused by a virus can't be treated with antibiotics. Staying on an antibiotic when you don't need it is more likely to be harmful than helpful.      You may experience side effects from your antibiotic.    Like all medications, antibiotics have side effects. Some of these can be serious.    Let you healthcare team know if you have any known allergies when you are admitted to the hospital.    One significant side effect of nearly all antibiotics is the risk of severe and sometimes deadly diarrhea caused by Clostridium difficile (C. Difficile). This occurs when a person takes antibiotics because some good germs are destroyed. Antibiotic use allows C. diificile to take over, putting patients at high risk for this serious infection.    As a patient or caregiver, it is important to understand your or your loved one's antibiotic treatment. It is especially important for caregivers to speak up when patients can't speak for themselves. Here are some important questions to ask your healthcare team.    What infection is this antibiotic treating and how do you know I have that infection?    What side effects might occur from this antibiotic?    How long will I need to take this antibiotic?    Is it safe to take this antibiotic with other medications or supplements (e.g., vitamins) that I am taking?     Are there any special directions I need to know about taking this antibiotic? For example, should I take it with food?    How will I be monitored to know whether my infection is responding to the antibiotic?    What tests may help to make sure the right antibiotic is prescribed for me?      Information provided by:  www.cdc.gov/getsmart  U.S. Department of Health and Human Services  Centers for disease Control and Prevention  National Center for Emerging and Zoonotic Infectious Diseases  Division of Healthcare Quality Promotion             Medication  List: This is a list of all your medications and when to take them. Check marks below indicate your daily home schedule. Keep this list as a reference.      Medications           Morning Afternoon Evening Bedtime As Needed    aspirin 81 MG EC tablet   Take 1 tablet (81 mg) by mouth daily   Start taking on:  10/14/2018   Last time this was given:  81 mg on 10/13/2018 10:38 AM                                budesonide 3 MG EC capsule   Commonly known as:  ENTOCORT EC   Take 9 mg by mouth every morning                                calcium carbonate 500 mg (elemental) 500 MG tablet   Commonly known as:  OS-FELA   Take 500 mg by mouth 2 times daily.                                ciprofloxacin 250 MG tablet   Commonly known as:  CIPRO   Take 1 tablet (250 mg) by mouth 2 times daily for 2 days   Start taking on:  10/14/2018                                LISINOPRIL PO   Take 20 mg by mouth daily                                Multi-vitamin Tabs tablet   Take 1 tablet by mouth daily.                                potassium chloride SA 10 MEQ CR tablet   Commonly known as:  K-DUR/KLOR-CON M   Take 1 tablet (10 mEq) by mouth daily                                rosuvastatin 20 MG tablet   Commonly known as:  CRESTOR   1 tablet (20 mg) by Oral or NG Tube route daily   Start taking on:  10/14/2018   Last time this was given:  20 mg on 10/13/2018 10:38 AM

## 2018-10-12 NOTE — PLAN OF CARE
Problem: Patient Care Overview  Goal: Plan of Care/Patient Progress Review  Outcome: No Change  Disoriented to person, place, time, and situation. Slurred garbled illogical speech. Impulsive, will have sitter this evening. Restless and angry. Spastic BUE/BLE. Fine tremors BUE. NIH score 6. Denies N/T. CMS intact. 5/5 BUE/BLE. VSS. NPO, Slurred speech. Up with 2, using bedpan. Incontinent of bowel and bladder. Urinary frequency and urgency. Denies pain. Plan pending neurology recommendations and workup.

## 2018-10-12 NOTE — PROGRESS NOTES
Daughter Leyla [id number] would like to be notified of medical updates, will be back tonight.

## 2018-10-12 NOTE — ED PROVIDER NOTES
History     Chief Complaint:    Speech confusion    History limited due to patients speech and mental status. History reported by EMS.    CHRISTINE Lopez is a 85 year old female who presents with speech confusion. EMS states that the patient last talked to her daughter yesterday at 1600, who also called her today and recalled the patient was having slurred speech in which she was concerned so she called 911. Upon arrival, EMS noted that she had slurred speech and confusion. They also note that she had bilaterally: equal breaths, equal smile, strong , and good movement. Her blood sugars were reported at 101. EMS continued to detail that they found Alprazolam in the patients kitchen from 9/4/2018, twice a day as needed, and a 60 quantity. EMS put her on a hold due to the patient not wanting to come to the emergency department. They also report that the patient was able to follow commands for them for exam, responded yes to eating breakfast and getting up at 0600, and communicated she has been sad over the past few weeks. The patient upon arrival in the emergency department denied abdominal pain, headache, or chest pain. The patient lives alone in an apartment.     Allergies:  Augmentin  Sulfa drugs     Medications:    Aspirin  Alprazolam    Effexor PO  Vigamox  Lisinopril PO  Os-pedro      Past Medical History:    Hypertension    Past Surgical History:    Past surgical history reviewed. No pertinent past surgical history.    Family History:    Family history reviewed. No pertinent family history.     Social History:  The patient was accompanied to the ED by EMS.  Smoking Status: Never Smoker  Smokeless Tobacco: Never Used  Alcohol Use: Yes  Marital Status:       Review of Systems   Cardiovascular: Negative for chest pain.   Gastrointestinal: Negative for abdominal pain.   Neurological: Positive for speech difficulty. Negative for headaches.   Psychiatric/Behavioral: Positive for confusion.   All  "other systems reviewed and are negative.    Physical Exam     Patient Vitals for the past 24 hrs:   BP Temp Temp src Heart Rate Resp SpO2 Height Weight   10/12/18 1200 103/75 - - - - - - -   10/12/18 1145 - - - 92 22 97 % - -   10/12/18 1130 123/88 - - 87 15 97 % - -   10/12/18 1041 195/83 98.1  F (36.7  C) Oral 83 20 95 % 1.651 m (5' 5\") 59 kg (130 lb)   10/12/18 1040 195/83 - - - - 94 % - -   10/12/18 1039 - - - - - 96 % - -     Physical Exam     Constitutional: Elderly white female, sitting.  HENT: No signs of trauma.   Eyes: Pupils are 2 mm, unable to corporate with EOM. No corrected bruits.  Neck: Normal range of motion. No JVD present. No cervical adenopathy.  Cardiovascular: Regular rhythm.  Exam reveals no gallop and no friction rub.    No murmur heard.  Pulmonary/Chest: Bilateral breath sounds normal. No wheezes, rhonchi or rales.  Abdominal: Soft. No tenderness. No rebound or guarding. transverse pubic incision. 1+ femoral pulses.  Musculoskeletal: No edema. No tenderness.   Lymphadenopathy: No lymphadenopathy.   Neurological: Awake and alert, able to state name, intermittent dysarthric, speech word finding problems, unable to follow simple commands, speech occasionally jumbled. Moves all extremities against resistance. Unable to corporate with finger nose, finger nose drift exam, toes downgoing.  Skin: Skin is warm and dry. No rash noted. No erythema.     Emergency Department Course     ECG:  ECG taken at 1131  Sinus rhythm with 1st degree AV block  Poor rwv prog  Rate 87 bpm. CO interval 222 ms. QRS duration 106 ms. QT/QTc 424/510 ms. P-R-T axes 64 8 73.    Imaging:  Radiology findings were communicated with the patient and the patients family who voiced understanding of the findings.    CT Head Perfusion w Contrast   Preliminary Result   IMPRESSION: Normal CT perfusion of the brain.         Radiation dose for this scan was reduced using automated exposure   control, adjustment of the mA and/or kV " according to patient size, or   iterative reconstruction technique.      CTA Head Neck with Contrast   Preliminary Result   IMPRESSION: Minimal calcified atherosclerotic plaque of the proximal   and distal internal carotid arteries on both sides that does not   result in any stenosis. Otherwise, normal neck and head CTA.      Radiation dose for this scan was reduced using automated exposure   control, adjustment of the mA and/or kV according to patient size, or   iterative reconstruction technique.      CT Head w/o Contrast   Preliminary Result   IMPRESSION:  Diffuse cerebral volume loss and cerebral white matter   changes consistent with chronic small vessel ischemic disease. No   evidence for acute intracranial pathology.         Radiation dose for this scan was reduced using automated exposure   control, adjustment of the mA and/or kV according to patient size, or   iterative reconstruction technique.      MR Brain w/o & w Contrast    (Results Pending)     Laboratory:  Laboratory findings were communicated with the patient and the patients family who voiced understanding of the findings.    CBC: WBC 6.8, HGB 12.5,   BMP: Potassium 3.3, GFR 55 o/w WNL (Creatinine 0.97)  INR: 0.92  Partial Thromboplastin Time: 31  UA: Leukocyte esterase moderate, WBC/HPF 32 H, RBC 1, WBC Clumps present, Bacteria few, Mucous present, o/w WNL.  Drug abuse screen 77 urine: Benzodiazepine Qual positive, o/w WNL    Interventions:  1055  mL IV  1055 ISOVUE-370 120 mL IV  1140 NS 1000 mL IV  1152 Ativan 1 mg IV  1214 Ativan 1 mg IV    Emergency Department Course:    1036 I performed an exam of the patient as documented above.     1039 Nursing notes and vitals reviewed.    1040 IV was inserted and blood was drawn for laboratory testing, results above.    1126 A urine sample was obtained for laboratory testing as documented above.    1215 Recheck and update.    1215 I spoke with Dr. Garcia of the hospitalist service from  [facility] regarding patient's presentation, findings, and plan of care.    1221 I personally reviewed the lab, imaging, and EKG results with the patient and the patients family and answered all related questions prior to admit.    Impression & Plan      Medical Decision Making:  Huong Lopez is a 85 year old female who presents to the emergency department today for evaluation of possible stroke. Her daughter spoke with her at 1600 yesterday she was fine, this morning when her daughter called her speech was confused. Paramedics came over and noticed confused and brought her in. When I examined her, her speech is confused, sometimes it is dysarthric and she can answer some questions but she does have word finding problems and also do noes have good receptive speech or receptive understain. The rest of her neuro exam seems intact. initial EKG showed sinus rhythm. CT CTA reveled no acute infarct or large vessel obstruction. Labs did show benzoyl's in her urine which we expected. There were also many white cells and clumps which could indicate an infection. The patient has been seen by neuro as a code stroke, she will go to MRI at this point also we will start her on Rocephin and she will be admitted to neurology. Stroke team has asked me to hod on aspirin for the time being.     Diagnosis:    ICD-10-CM    1. Cerebrovascular accident (CVA), unspecified mechanism (H) I63.9 Urine Culture Aerobic Bacterial     Urine Culture Aerobic Bacterial   2. Urinary tract infection without hematuria, site unspecified N39.0      Disposition:   The patient is admitted into the care of Dr. Garcia.    Critical Care time was 35 minutes for this patient excluding procedures.     CMS Diagnoses: The patient has stroke symptoms:           ED Stroke specific documentation           NIHSS PDF          Protocol PDF     Patient last known well time: 1600  ED Provider first to bedside at: 1036  CT Results received at: 1108  Patient was  not treated with TPA due to the following reason(s):  Out of the treatment time window    National Institutes of Health Stroke Scale (Baseline)  Time Performed: 1036      Score    Level of consciousness: (0)   Alert, keenly responsive    LOC questions: (2)   Answers neither question correctly    LOC commands: (2)   Performs neither task correctly    Best gaze: (0)   Normal    Visual: (0)   No visual loss    Facial palsy: (0)   Normal symmetrical movements    Motor arm (left): (0)   No drift    Motor arm (right): (0)   No drift    Motor leg (left): (0)   No drift    Motor leg (right): (0)   No drift    Limb ataxia: (0)   Absent    Sensory: (0)   Normal- no sensory loss    Best language: (1)   Mild to moderate aphasia    Dysarthria: (1)   Mild to moderate dysarthria    Extinction and inattention: (0)   No abnormality        Total Score:  6        Stroke Mimics were considered (including migraine headache, seizure disorder, hypoglycemia (or hyperglycemia), head or spinal trauma, CNS infection, Toxin ingestion and shock state (e.g. sepsis) .              Scribe Disclosure:  I, Orla Severson, am serving as a scribe at 10:39 AM on 10/12/2018 to document services personally performed by Camden Tucker based on my observations and the provider's statements to me.     EMERGENCY DEPARTMENT       Camden Tucker MD  10/12/18 4799

## 2018-10-12 NOTE — ED NOTES
"[facility]  ED Nurse Handoff Report    ED Chief complaint: Slurred Speech (Daughter called 911 after speaking with pt and noticing slurred speech with her mom.  Pt alert on arrival, speech garbled and slurred, difficulty following commands.)      ED Diagnosis:   Final diagnoses:   Cerebrovascular accident (CVA), unspecified mechanism (H)       Code Status: Full Code    Allergies:   Allergies   Allergen Reactions     Augmentin Hives     Sulfa Drugs Hives       Activity level - Baseline/Home:  Independent    Activity Level - Current:   Unable to Assess     Needed?: No    Isolation: No  Infection: Not Applicable  Bariatric?: No    Vital Signs:   Vitals:    10/12/18 1040 10/12/18 1041 10/12/18 1130 10/12/18 1145   BP: 195/83 195/83 123/88    Resp:  20 15 22   Temp:  98.1  F (36.7  C)     TempSrc:  Oral     SpO2: 94% 95% 97% 97%   Weight:  59 kg (130 lb)     Height:  1.651 m (5' 5\")         Cardiac Rhythm: ,   Cardiac  Cardiac Rhythm: Normal sinus rhythm    Pain level:      Is this patient confused?: Yes   [place] - Suicide Severity Rating Scale Completed?  Yes  If yes, what color did the patient score?  White    Patient Report: Initial Complaint: Pt presents after daughter called 911 after a phone conversation where she felt the pt's speech is unintelligible and slurred. Aphasic and extremely confused upon arrival. Equal strength present in all 4 extremities. Confused, disoriented x4. Does attempt to get out of bed, recommend sitter or room next to nurses station as daughter is not helpful in redirecting pt. NSR. Afebrile. UA shows UTI. CT of head negative for stroke, MRI pending. Takes xanax regularly at home.   Tests Performed: CT, EKG, labs, UA, MRI  Abnormal Results:   Results for orders placed or performed during the hospital encounter of 10/12/18   CTA Head Neck with Contrast    Narrative    CT ANGIOGRAM OF THE HEAD AND NECK WITHOUT AND WITH CONTRAST   10/12/2018 11:06 AM "     COMPARISON: None.    HISTORY: Code stroke.     TECHNIQUE:  Precontrast localizing scans were followed by CT  angiography with an injection of 70 mL Isovue-370 nonionic intravenous  contrast material with scans through the head and neck.  Images were  transferred to a separate 3-D workstation where multiplanar  reformations and 3-D images were created.  Estimates of carotid  stenoses are made relative to the distal internal carotid artery  diameters except as noted.      FINDINGS:   Neck CTA: The common carotid arteries bilaterally are patent without  stenosis. There is calcified atherosclerotic plaque at the origins of  the internal carotid arteries on both sides that does not result in  stenosis on either side. The cervical internal carotid arteries  bilaterally are patent without stenosis. The vertebral arteries  bilaterally are patent without stenosis.    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 The left  posterior cerebral artery is supplied by the patent left posterior  communicating artery. There is calcified atherosclerotic plaque at the  intracranial distal internal carotid arteries on both sides that does  not result in stenosis on either side. The basilar, bilateral anterior  cerebral, bilateral middle cerebral and bilateral posterior cerebral  arteries are patent and unremarkable. The anterior communicating and  bilateral posterior communicating arteries are patent and  unremarkable.      Impression    IMPRESSION: Minimal calcified atherosclerotic plaque of the proximal  and distal internal carotid arteries on both sides that does not  result in any stenosis. Otherwise, normal neck and head CTA.    Radiation dose for this scan was reduced using automated exposure  control, adjustment of the mA and/or kV according to patient size, or  iterative reconstruction technique.   CT Head w/o Contrast    Narrative    CT  OF THE HEAD WITHOUT CONTRAST 10/12/2018 11:05 AM     COMPARISON: None.    HISTORY: Code Stroke; slurred speech.    TECHNIQUE: 5 mm thick axial CT images of the head were acquired  without IV contrast material.    FINDINGS:  There is moderate diffuse cerebral volume loss. There are  extensive confluent areas of decreased density in the cerebral white  matter bilaterally that are consistent with sequela of chronic small  vessel ischemic disease.    The ventricles and basal cisterns are within normal limits in  configuration given the degree of cerebral volume loss.  There is no  midline shift. There are no extra-axial fluid collections.    No intracranial hemorrhage, mass or recent infarct.    The visualized paranasal sinuses are well-aerated. There is no  mastoiditis. There are no fractures of the visualized bones.      Impression    IMPRESSION:  Diffuse cerebral volume loss and cerebral white matter  changes consistent with chronic small vessel ischemic disease. No  evidence for acute intracranial pathology.      Radiation dose for this scan was reduced using automated exposure  control, adjustment of the mA and/or kV according to patient size, or  iterative reconstruction technique.   CT Head Perfusion w Contrast    Narrative    CT BRAIN PERFUSION 10/12/2018 11:10 AM    COMPARISON: None.    HISTORY: Code Stroke.     TECHNIQUE: Time sequential axial CT images of the head were acquired  during the administration of intravenous contrast (50mL Isovue-370).  CTA images of the [place] as well as color perfusion maps of  the brain were created from this time sequential axial source data.    FINDINGS: There are no focal or regional perfusion defects in the  brain.      Impression    IMPRESSION: Normal CT perfusion of the brain.      Radiation dose for this scan was reduced using automated exposure  control, adjustment of the mA and/or kV according to patient size, or  iterative reconstruction technique.   Drug  abuse screen 77 urine ([place])   Result Value Ref Range    Amphetamine Qual Urine Negative NEG^Negative    Barbiturates Qual Urine Negative NEG^Negative    Benzodiazepine Qual Urine Positive (A) NEG^Negative    Cannabinoids Qual Urine Negative NEG^Negative    Cocaine Qual Urine Negative NEG^Negative    Opiates Qualitative Urine Negative NEG^Negative    PCP Qual Urine Negative NEG^Negative   UA with Microscopic reflex to Culture   Result Value Ref Range    Color Urine Light Yellow     Appearance Urine Clear     Glucose Urine Negative NEG^Negative mg/dL    Bilirubin Urine Negative NEG^Negative    Ketones Urine Negative NEG^Negative mg/dL    Specific Gravity Urine 1.028 1.003 - 1.035    Blood Urine Negative NEG^Negative    pH Urine 7.0 5.0 - 7.0 pH    Protein Albumin Urine Negative NEG^Negative mg/dL    Urobilinogen mg/dL Normal 0.0 - 2.0 mg/dL    Nitrite Urine Negative NEG^Negative    Leukocyte Esterase Urine Moderate (A) NEG^Negative    Source Catheterized Urine     WBC Urine 32 (H) 0 - 5 /HPF    RBC Urine 1 0 - 2 /HPF    WBC Clumps Present (A) NEG^Negative /HPF    Bacteria Urine Few (A) NEG^Negative /HPF    Squamous Epithelial /HPF Urine <1 0 - 1 /HPF    Mucous Urine Present (A) NEG^Negative /LPF   Basic metabolic panel   Result Value Ref Range    Sodium 141 133 - 144 mmol/L    Potassium 3.3 (L) 3.4 - 5.3 mmol/L    Chloride 106 94 - 109 mmol/L    Carbon Dioxide 29 20 - 32 mmol/L    Anion Gap 6 3 - 14 mmol/L    Glucose 96 70 - 99 mg/dL    Urea Nitrogen 16 7 - 30 mg/dL    Creatinine 0.97 0.52 - 1.04 mg/dL    GFR Estimate 55 (L) >60 mL/min/1.7m2    GFR Estimate If Black 66 >60 mL/min/1.7m2    Calcium 8.6 8.5 - 10.1 mg/dL   CBC with platelets differential   Result Value Ref Range    WBC 6.8 4.0 - 11.0 10e9/L    RBC Count 4.09 3.8 - 5.2 10e12/L    Hemoglobin 12.5 11.7 - 15.7 g/dL    Hematocrit 37.3 35.0 - 47.0 %    MCV 91 78 - 100 fl    MCH 30.6 26.5 - 33.0 pg    MCHC 33.5 31.5 - 36.5 g/dL    RDW 12.7 10.0 - 15.0 %     Platelet Count 186 150 - 450 10e9/L    Diff Method Automated Method     % Neutrophils 66.8 %    % Lymphocytes 23.0 %    % Monocytes 8.6 %    % Eosinophils 0.6 %    % Basophils 0.4 %    % Immature Granulocytes 0.6 %    Nucleated RBCs 0 0 /100    Absolute Neutrophil 4.5 1.6 - 8.3 10e9/L    Absolute Lymphocytes 1.6 0.8 - 5.3 10e9/L    Absolute Monocytes 0.6 0.0 - 1.3 10e9/L    Absolute Eosinophils 0.0 0.0 - 0.7 10e9/L    Absolute Basophils 0.0 0.0 - 0.2 10e9/L    Abs Immature Granulocytes 0.0 0 - 0.4 10e9/L    Absolute Nucleated RBC 0.0    INR   Result Value Ref Range    INR 0.92 0.86 - 1.14   Partial thromboplastin time   Result Value Ref Range    PTT 31 22 - 37 sec   EKG 12-lead, tracing only   Result Value Ref Range    Interpretation ECG Click View Image link to view waveform and result        Treatments provided: abx, 1L NS bolus, multiple doses of ativan for anxiety and attempting to get out of bed    Family Comments: daughter present    OBS brochure/video discussed/provided to patient/family: N/A              Name of person given brochure if not patient: NA              Relationship to patient: NA    ED Medications:   Medications   0.9% sodium chloride BOLUS (0 mLs Intravenous Stopped 10/12/18 1214)     Followed by   sodium chloride 0.9% infusion (not administered)   100mL Saline Flush (100 mLs Intravenous Given 10/12/18 1055)   iopamidol (ISOVUE-370) solution 120 mL (120 mLs Intravenous Given 10/12/18 1055)   LORazepam (ATIVAN) injection 1 mg (1 mg Intravenous Given 10/12/18 1152)   LORazepam (ATIVAN) injection 1 mg (1 mg Intravenous Given 10/12/18 1214)       Drips infusing?:  No    For the majority of the shift this patient was Yellow.   Interventions performed were frequent reorientation and comfort measures.    Severe Sepsis OR Septic Shock Diagnosis Present: No    To be done/followed up on inpatient unit:      ED NURSE PHONE NUMBER: [phone]

## 2018-10-13 ENCOUNTER — APPOINTMENT (OUTPATIENT)
Dept: PHYSICAL THERAPY | Facility: CLINIC | Age: 83
DRG: 065 | End: 2018-10-13
Attending: INTERNAL MEDICINE
Payer: COMMERCIAL

## 2018-10-13 ENCOUNTER — APPOINTMENT (OUTPATIENT)
Dept: SPEECH THERAPY | Facility: CLINIC | Age: 83
DRG: 065 | End: 2018-10-13
Attending: INTERNAL MEDICINE
Payer: COMMERCIAL

## 2018-10-13 ENCOUNTER — APPOINTMENT (OUTPATIENT)
Dept: OCCUPATIONAL THERAPY | Facility: CLINIC | Age: 83
DRG: 065 | End: 2018-10-13
Attending: INTERNAL MEDICINE
Payer: COMMERCIAL

## 2018-10-13 ENCOUNTER — APPOINTMENT (OUTPATIENT)
Dept: CARDIOLOGY | Facility: CLINIC | Age: 83
DRG: 065 | End: 2018-10-13
Attending: INTERNAL MEDICINE
Payer: COMMERCIAL

## 2018-10-13 VITALS
SYSTOLIC BLOOD PRESSURE: 147 MMHG | HEIGHT: 65 IN | WEIGHT: 130 LBS | RESPIRATION RATE: 18 BRPM | BODY MASS INDEX: 21.66 KG/M2 | OXYGEN SATURATION: 96 % | DIASTOLIC BLOOD PRESSURE: 70 MMHG | TEMPERATURE: 97.7 F

## 2018-10-13 LAB
ANION GAP SERPL CALCULATED.3IONS-SCNC: 9 MMOL/L (ref 3–14)
BUN SERPL-MCNC: 14 MG/DL (ref 7–30)
CALCIUM SERPL-MCNC: 8 MG/DL (ref 8.5–10.1)
CHLORIDE SERPL-SCNC: 109 MMOL/L (ref 94–109)
CO2 SERPL-SCNC: 24 MMOL/L (ref 20–32)
CREAT SERPL-MCNC: 0.84 MG/DL (ref 0.52–1.04)
ERYTHROCYTE [DISTWIDTH] IN BLOOD BY AUTOMATED COUNT: 12.9 % (ref 10–15)
GFR SERPL CREATININE-BSD FRML MDRD: 64 ML/MIN/1.7M2
GLUCOSE SERPL-MCNC: 89 MG/DL (ref 70–99)
HCT VFR BLD AUTO: 33.3 % (ref 35–47)
HGB BLD-MCNC: 11.2 G/DL (ref 11.7–15.7)
MCH RBC QN AUTO: 31 PG (ref 26.5–33)
MCHC RBC AUTO-ENTMCNC: 33.6 G/DL (ref 31.5–36.5)
MCV RBC AUTO: 92 FL (ref 78–100)
PLATELET # BLD AUTO: 150 10E9/L (ref 150–450)
POTASSIUM SERPL-SCNC: 3.3 MMOL/L (ref 3.4–5.3)
RBC # BLD AUTO: 3.61 10E12/L (ref 3.8–5.2)
SODIUM SERPL-SCNC: 142 MMOL/L (ref 133–144)
WBC # BLD AUTO: 5.4 10E9/L (ref 4–11)

## 2018-10-13 PROCEDURE — 25000132 ZZH RX MED GY IP 250 OP 250 PS 637: Performed by: INTERNAL MEDICINE

## 2018-10-13 PROCEDURE — 92610 EVALUATE SWALLOWING FUNCTION: CPT | Mod: GN

## 2018-10-13 PROCEDURE — 36415 COLL VENOUS BLD VENIPUNCTURE: CPT | Performed by: INTERNAL MEDICINE

## 2018-10-13 PROCEDURE — 93306 TTE W/DOPPLER COMPLETE: CPT

## 2018-10-13 PROCEDURE — 40000915 ZZH STATISTIC SITTER, EVENING HOURS

## 2018-10-13 PROCEDURE — 97166 OT EVAL MOD COMPLEX 45 MIN: CPT | Mod: GO | Performed by: OCCUPATIONAL THERAPIST

## 2018-10-13 PROCEDURE — 93005 ELECTROCARDIOGRAM TRACING: CPT

## 2018-10-13 PROCEDURE — 97161 PT EVAL LOW COMPLEX 20 MIN: CPT | Mod: GP

## 2018-10-13 PROCEDURE — 40000193 ZZH STATISTIC PT WARD VISIT

## 2018-10-13 PROCEDURE — 40000914 ZZH STATISTIC SITTER, DAY HOURS

## 2018-10-13 PROCEDURE — 40000133 ZZH STATISTIC OT WARD VISIT: Performed by: OCCUPATIONAL THERAPIST

## 2018-10-13 PROCEDURE — 99238 HOSP IP/OBS DSCHRG MGMT 30/<: CPT | Performed by: INTERNAL MEDICINE

## 2018-10-13 PROCEDURE — 85027 COMPLETE CBC AUTOMATED: CPT | Performed by: INTERNAL MEDICINE

## 2018-10-13 PROCEDURE — 97535 SELF CARE MNGMENT TRAINING: CPT | Mod: GO | Performed by: OCCUPATIONAL THERAPIST

## 2018-10-13 PROCEDURE — 97530 THERAPEUTIC ACTIVITIES: CPT | Mod: GO | Performed by: OCCUPATIONAL THERAPIST

## 2018-10-13 PROCEDURE — 25000125 ZZHC RX 250: Performed by: INTERNAL MEDICINE

## 2018-10-13 PROCEDURE — 93010 ELECTROCARDIOGRAM REPORT: CPT | Performed by: INTERNAL MEDICINE

## 2018-10-13 PROCEDURE — 40000225 ZZH STATISTIC SLP WARD VISIT

## 2018-10-13 PROCEDURE — 93306 TTE W/DOPPLER COMPLETE: CPT | Mod: 26 | Performed by: INTERNAL MEDICINE

## 2018-10-13 PROCEDURE — 25000128 H RX IP 250 OP 636: Performed by: INTERNAL MEDICINE

## 2018-10-13 PROCEDURE — 80048 BASIC METABOLIC PNL TOTAL CA: CPT | Performed by: INTERNAL MEDICINE

## 2018-10-13 RX ORDER — ATORVASTATIN CALCIUM 20 MG/1
20 TABLET, FILM COATED ORAL EVERY EVENING
Status: DISCONTINUED | OUTPATIENT
Start: 2018-10-13 | End: 2018-10-13

## 2018-10-13 RX ORDER — POTASSIUM CHLORIDE 750 MG/1
10 TABLET, EXTENDED RELEASE ORAL DAILY
Qty: 30 TABLET | Refills: 0 | Status: SHIPPED | OUTPATIENT
Start: 2018-10-13

## 2018-10-13 RX ORDER — CIPROFLOXACIN 250 MG/1
250 TABLET, FILM COATED ORAL 2 TIMES DAILY
Qty: 4 TABLET | Refills: 0 | Status: SHIPPED | OUTPATIENT
Start: 2018-10-14 | End: 2018-10-16

## 2018-10-13 RX ORDER — ROSUVASTATIN CALCIUM 20 MG/1
20 TABLET, COATED ORAL DAILY
Qty: 30 TABLET | Refills: 1 | Status: SHIPPED | OUTPATIENT
Start: 2018-10-14

## 2018-10-13 RX ORDER — ASPIRIN 81 MG/1
81 TABLET ORAL DAILY
Status: DISCONTINUED | OUTPATIENT
Start: 2018-10-13 | End: 2018-10-13 | Stop reason: HOSPADM

## 2018-10-13 RX ADMIN — POTASSIUM CHLORIDE 10 MEQ: 149 INJECTION, SOLUTION, CONCENTRATE INTRAVENOUS at 09:34

## 2018-10-13 RX ADMIN — CEFTRIAXONE SODIUM 1 G: 1 INJECTION, POWDER, FOR SOLUTION INTRAMUSCULAR; INTRAVENOUS at 14:32

## 2018-10-13 RX ADMIN — POTASSIUM CHLORIDE 10 MEQ: 149 INJECTION, SOLUTION, CONCENTRATE INTRAVENOUS at 11:56

## 2018-10-13 RX ADMIN — ROSUVASTATIN CALCIUM 20 MG: 20 TABLET, FILM COATED ORAL at 10:38

## 2018-10-13 RX ADMIN — ASPIRIN 81 MG: 81 TABLET, COATED ORAL at 10:38

## 2018-10-13 RX ADMIN — POTASSIUM CHLORIDE 10 MEQ: 149 INJECTION, SOLUTION, CONCENTRATE INTRAVENOUS at 13:10

## 2018-10-13 RX ADMIN — POTASSIUM CHLORIDE 10 MEQ: 149 INJECTION, SOLUTION, CONCENTRATE INTRAVENOUS at 10:38

## 2018-10-13 RX ADMIN — SODIUM CHLORIDE: 9 INJECTION, SOLUTION INTRAVENOUS at 00:21

## 2018-10-13 ASSESSMENT — ACTIVITIES OF DAILY LIVING (ADL)
ADLS_ACUITY_SCORE: 16
PREVIOUS_RESPONSIBILITIES: MEAL PREP;HOUSEKEEPING;LAUNDRY;SHOPPING;MEDICATION MANAGEMENT;FINANCES;DRIVING
ADLS_ACUITY_SCORE: 16

## 2018-10-13 NOTE — PROGRESS NOTES
10/13/18 1400   Quick Adds   Type of Visit Initial Occupational Therapy Evaluation   Living Environment   Lives With alone   Living Arrangements apartment   Home Accessibility tub/shower is not walk in   Number of Stairs to Enter Home 0   Number of Stairs Within Home 0   Transportation Available car;family or friend will provide  (pt drives at baseline)   Living Environment Comment Pt lives alone and was completing all home tasks indep.   Self-Care   Dominant Hand right   Usual Activity Tolerance good   Current Activity Tolerance moderate   Equipment Currently Used at Home grab bar   Activity/Exercise/Self-Care Comment Pt reports she could walk miles   Functional Level Prior   Ambulation 0-->independent   Transferring 0-->independent   Toileting 0-->independent   Bathing 1-->assistive equipment   Dressing 0-->independent   Eating 0-->independent   Communication 2-->difficulty understanding (not related to language barrier)   Swallowing 0-->swallows foods/liquids without difficulty   Fall history within last six months yes   Number of times patient has fallen within last six months 1   Which of the above functional risks had a recent onset or change? communication/speech;cognition   Prior Functional Level Comment Pt reports being indep with all ADLs, including driving for groceries; dtr confirms this   General Information   Onset of Illness/Injury or Date of Surgery - Date 10/12/18   Referring Physician Sandrita Ferris   Patient/Family Goals Statement home   Additional Occupational Profile Info/Pertinent History of Current Problem pt admitted with slurred speech and confusion   Precautions/Limitations fall precautions   General Observations Pt seated in chair upon arrival, daughter and sitter present.  Pt agreeable to OT session, but asking repetitively for doctor   General Info Comments Activity order: up with assist   Cognitive Status Examination   Orientation orientation to person, place and time   Level of  Consciousness alert   Able to Follow Commands WNL/WFL   Memory impaired   Cognitive Comment Pt repeats information eg. that she grew up with 12 siblings, a number of times in session; asked for MD repetitively; able to recall 3/5 objects with initial rehearsal and delayed memory check. Daughter states pt is at baseline for cognition, and has no concerns about pt discharging to home.   Sensory Examination   Sensory Quick Adds No deficits were identified   Pain Assessment   Patient Currently in Pain No   Integumentary/Edema   Integumentary/Edema Comments Mild B ankle edema   Posture   Posture forward head position;protracted shoulders   Range of Motion (ROM)   ROM Comment BUE WFL   Strength   Strength Comments RUE generally 4+/5, LUE generally 5/5   Coordination   Fine Motor Coordination mild tremor with finger to nose L hand   Transfer Skill: Sit to Stand   Level of [place]: Sit/Stand independent   Transfer Skill: Toilet Transfer   Level of [place]: Toilet independent   Toilet Transfer Skill Comments Pt has small bathroom with vanity beside toilet   Tub/Shower Transfer   Tub/Shower Transfer Comments Pt has tub/shower combo with 2 grab bars   Balance   Balance Comments Good seated; good ambulating in room without AD   Lower Body Dressing   Level of [place]: Dress Lower Body independent   Instrumental Activities of Daily Living (IADL)   Previous Responsibilities meal prep;housekeeping;laundry;shopping;medication management;finances;driving   IADL Comments daughter states they can assist if needed   Activities of Daily Living Analysis   Impairments Contributing to Impaired Activities of Daily Living cognition impaired;strength decreased;coordination impaired   General Therapy Interventions   Planned Therapy Interventions ADL retraining;cognition;transfer training   Clinical Impression   Criteria for Skilled Therapeutic Interventions Met yes, treatment indicated   OT Diagnosis impaired ADLs   Influenced by  "the following impairments cognition, balance, weakness   Assessment of Occupational Performance 3-5 Performance Deficits   Identified Performance Deficits dressing, bathing, toileting, meds management, driving   Clinical Decision Making (Complexity) Moderate complexity   Therapy Frequency daily   Predicted Duration of Therapy Intervention (days/wks) 3 days   Anticipated Discharge Disposition Home with Outpatient Therapy;Home with Assist   Risks and Benefits of Treatment have been explained. Yes   Patient, Family & other staff in agreement with plan of care Yes   [facility] AM-PAC  \"6 Clicks\" Daily Activity Inpatient Short Form   1. Putting on and taking off regular lower body clothing? 4 - None   2. Bathing (including washing, rinsing, drying)? 3 - A Little   3. Toileting, which includes using toilet, bedpan or urinal? 4 - None   4. Putting on and taking off regular upper body clothing? 4 - None   5. Taking care of personal grooming such as brushing teeth? 4 - None   6. Eating meals? 4 - None   Daily Activity Raw Score (Score out of 24.Lower scores equate to lower levels of function) 23   Total Evaluation Time   Total Evaluation Time (Minutes) 8     "

## 2018-10-13 NOTE — PLAN OF CARE
Problem: Patient Care Overview  Goal: Plan of Care/Patient Progress Review  Outcome: Improving  [id number]: Pt is disoriented to place, time, and situation. VSS on RA, Slurred/ rambling speech. Nuero intact. Denied pain at time of assessment. Sitter w/pt during shift. NPO. Up A of 1. Incontinent of bowel and bladder. Urinary frequency and urgency. D/C pending neurology consult possibly 2-3 days.

## 2018-10-13 NOTE — PLAN OF CARE
Problem: Patient Care Overview  Goal: Plan of Care/Patient Progress Review  Discharge Planner PT   Patient plan for discharge: Home  Current status: PT eval completed and discharged. Pt lives alone in an apartment with elevator access and not stairs to manage. Pt was ind with all ADL's and gait piror to admission. Currently pt is ind with all transfers and supervision with ambulation x 400 ft without any LOB or SOB.   Barriers to return to prior living situation: Memory/safety  Recommendations for discharge: Home with 24 hr supervision   Rationale for recommendations: Pt will benefit from 24 hr supervision for medication management and house hold tasks.        Entered by: Maximo Hankins 10/13/2018 3:33 PM

## 2018-10-13 NOTE — PLAN OF CARE
Problem: Patient Care Overview  Goal: Plan of Care/Patient Progress Review  Outcome: Improving  A&O x2, disoriented to time and place. Slurred speech and slight tremors in BUE. Otherwise neuros intact. VSS. Tele SBD w/ 1st degree AVB. Sitter in room throughout night. No aggression or negative statements made. Reported feeling restless due to time in bed; refused offer to sit up in chair or ambulate in murillo. NPO diet until st dysphagia screen. Up with assist x1 with walker/GB.  Denies pain. Discharge plan pending, continue monitoring.

## 2018-10-13 NOTE — PLAN OF CARE
"Problem: Patient Care Overview  Goal: Plan of Care/Patient Progress Review  OT order received.  Evaluation completed, treatment initiated. Patient is an 85 year old female admitted with confusion and slurred speech.  She lives alone in an apartment, but has supportive family in the same building (brother) and city.  She reports being independent with all ADLs and IADLs, including driving, prior to admit.  Daughter present for session and confirmed PLOF.      Discharge Planner OT   Patient plan for discharge: home  Current status: Independent with dressing, toilet transfer, Jacob tub/shower transfer.  Oriented to date/place.  When memory checked, had patient rehearse 5 items a few times.  Delayed recall was 3/5.  Patient also relating information repetitively (\"I had 6 brothers and 6 sisters and my mom was 6 feet and elegant\").  Also asking for the doctor repetitively.  When daughter questioned about cognition, daughter insisted that patient was at baseline, and completely safe, that she has talked this way since her spouse  a few years ago.  Per MD report, patient not aware of why she was taking certain meds at home.  Barriers to return to prior living situation: cogntion  Recommendations for discharge: strongly recommend 24/7 supervision upon discharge, and for continued supervision over medication management.  Caution about returning to driving.  Recommend OP OT or SLP cognitive evaluation.  Rationale for recommendations: patient doing well physically, and anticipate she could return home with 24/7 supervision for cognition.        Entered by: KARLA SÁNCHEZ 10/13/2018 3:37 PM         "

## 2018-10-13 NOTE — PROGRESS NOTES
10/13/18 1515   Quick Adds   Type of Visit Initial PT Evaluation   Living Environment   Lives With alone   Living Arrangements apartment   Home Accessibility no concerns;bed and bath on same level   Number of Stairs to Enter Home 0   Number of Stairs Within Home 0   Stair Railings at Home none   Transportation Available family or friend will provide;car   Living Environment Comment Pt lives alone in an apartment with elevator access and no stairs to manage.    Self-Care   Dominant Hand right   Usual Activity Tolerance good   Current Activity Tolerance good   Regular Exercise no   Activity/Exercise/Self-Care Comment Pt was ind with ADL's.    Functional Level Prior   Ambulation 0-->independent   Transferring 0-->independent   Toileting 0-->independent   Bathing 0-->independent   Dressing 0-->independent   Eating 0-->independent   Communication 0-->understands/communicates without difficulty   Swallowing 0-->swallows foods/liquids without difficulty   Cognition 1 - attention or memory deficits   Fall history within last six months yes   Number of times patient has fallen within last six months 1   Which of the above functional risks had a recent onset or change? cognition   Prior Functional Level Comment Pt was ind with ambualtion without the use of any ADs   General Information   Onset of Illness/Injury or Date of Surgery - Date 10/12/18   Referring Physician Sandrita Garcia MD   Patient/Family Goals Statement Go home   Pertinent History of Current Problem (include personal factors and/or comorbidities that impact the POC) Pt with PMH of hypertension, dyslipidemia, depression/anxiety, and sensorineural hearing loss, who has presented with abnormal speech. MRI negative for infarcts.    Precautions/Limitations fall precautions   Weight-Bearing Status - LLE full weight-bearing   Weight-Bearing Status - RLE full weight-bearing   General Observations Pleasant and cooperative. Eager to go home   General Info Comments  "Activity: up with assist   Cognitive Status Examination   Orientation person;place   Level of Consciousness alert   Follows Commands and Answers Questions 100% of the time   Personal Safety and Judgment impulsive   Pain Assessment   Patient Currently in Pain No   Range of Motion (ROM)   ROM Comment BLE: WFL   Strength   Strength Comments BLE: 4+/5 bilaterally   Bed Mobility   Bed Mobility Comments NT   Transfer Skills   Transfer Comments STS x 2 at independent    Gait   Gait Comments Gait x 400 ft without AD\"s and Supervision. No LOB or SOB noted. Pt wore a slip on shoe that is too big for her feet and when asked to wear socks pt refused. However after ambaultion when daughter asked to wear socks with the shoe pt, agreed.    Balance   Balance Comments Sitting: good, standing: good    Sensory Examination   Sensory Perception no deficits were identified   Coordination   Coordination no deficits were identified   Muscle Tone   Muscle Tone no deficits were identified   Clinical Impression   Criteria for Skilled Therapeutic Intervention evaluation only;current level of function same as previous level of function   Clinical Presentation Stable/Uncomplicated   Clinical Presentation Rationale Pt admitted with abdnormal speech. MRI : neg for acute stroke.    Clinical Decision Making (Complexity) Low complexity   Anticipated Discharge Disposition Home  (with 24 hr family supervision for safety)   Clinical Impression Comments Pt is at baseline with fucntional mobility and transfers at this time Therefore skilled PT interventions are not warrented. Pt is advised to ambualte the hallway with [facility] staff's assistance for safety.    [facility] AM-PAC  \"6 Clicks\" V.2 Basic Mobility Inpatient Short Form   1. Turning from your back to your side while in a flat bed without using bedrails? 4 - None   2. Moving from lying on your back to sitting on the side of a flat bed without using bedrails? 4 - None   3. Moving to and from a " bed to a chair (including a wheelchair)? 4 - None   4. Standing up from a chair using your arms (e.g., wheelchair, or bedside chair)? 4 - None   5. To walk in hospital room? 4 - None   6. Climbing 3-5 steps with a railing? 3 - A Little   Basic Mobility Raw Score (Score out of 24.Lower scores equate to lower levels of function) 23   Total Evaluation Time   Total Evaluation Time (Minutes) 15

## 2018-10-13 NOTE — PROGRESS NOTES
[facility]    Hospitalist Progress Note    Assessment & Plan   Huong Lopez is a 85 year old female who was admitted on 10/12/2018.      Ms. Huong Lopez is a delightful 85-year-old  lady with a past medical history notable for hypertension, dyslipidemia, depression/anxiety, osteopenia, sensorineural hearing loss and alcohol abuse in remission, who has presented with abnormal speech and confusion.        Acute ischemic stroke versus acute toxic encephalopathy due to benzodiazepine overdose:  Her daughter last spoke to her at 1600 hours yesterday and called the patient again this morning noting slurred speech.  Daughter called 911.  Upon arrival, EMS noted slurred speech and confusion.  EMS also found a bottle of alprazolam from 09/04/2018, ordered twice a day as needed.  The workup in the ER shows a U-tox positive for benzodiazepines.  CT head with and without contrast and CT angio of the head and neck are essentially unremarkable.  Electrocardiogram reveals normal sinus rhythm, first-degree AV block, prolonged QTc of 510 and poor R-wave progression in anterior leads.  Chemistry and hematology profile are essentially unremarkable.  Code stroke was activated in the Emergency Department and patient seen by neurology  -brain MRI uninterpretable due to motion artifact  -neurology rec  Asa 81 mg a day and crestor for secondary stroke prevention  -today back to normal  -talked with patient and daughter, pt says she does not take alprazolam on a regular basis. Will discontinue         Hypokalemia.  Potassium is slightly low at 3.3, which we will replace per protocol.   -will discharge on 10 meq of potassium and recheck when seen by PMD    Hypertension: restart her usual lisinopril on discharge     Abnormal urinalysis:  Urinalysis shows clear urine, but moderate leukocyte esterase, 32 WBCs and a few bacteria.    -pt denies urinary sx  -will discharge on cipro for 2 days   -PMD to follow-up on  urine culture    Possible dementia  -recommended to daughter that the pt get cognitive testing for mild dementia.  -still driving    .   Depression/anxiety:  Her prior to admission alprazolam will be stopped  6.  Deep vein thrombosis prophylaxis:  Pneumatic compression device.   7.  Code status:  Per Epic, the patient is DNR; however, the daughter who is with the patient is not certain about the code status as she indicates that her sister is the durable power of .  I tried to reach the other daughter, but she is not available.  For now, I will keep the patient full code until further discuss the code status.   -rediscuss with PMD  8.  Disposition:  Home today        # Pain Assessment:  Current Pain Score 10/13/2018   Patient currently in pain? denies   Pain score (0-10) -   No       Text Page (7am - 6pm)  Ina Shetty MD    Interval History   No complaints, eager to go home    -Data reviewed today: I reviewed all new labs and imaging results over the last 24 hours. I personally reviewed no images or EKG's today.    Physical Exam   Temp: 98.6  F (37  C) Temp src: Oral BP: 111/52   Heart Rate: 64 Resp: 18 SpO2: 93 % O2 Device: None (Room air)    Vitals:    10/12/18 1041   Weight: 59 kg (130 lb)     Vital Signs with Ranges  Temp:  [97.7  F (36.5  C)-100.2  F (37.9  C)] 98.6  F (37  C)  Heart Rate:  [61-92] 64  Resp:  [15-22] 18  BP: (103-195)/(49-88) 111/52  SpO2:  [93 %-97 %] 93 %  I/O last 3 completed shifts:  In: 576 [I.V.:576]  Out: 500 [Urine:500]    Constitutional: alert   Respiratory: clear to auscultation, no wheezing or rhonchi   Cardiovascular: regular rate and rhythm without murmer or rub   GI:positive bowel sounds, non-tender   Skin/Integumen: no rashes    Other:        Medications     - MEDICATION INSTRUCTIONS -       sodium chloride 100 mL/hr at 10/13/18 0607       cefTRIAXone  1 g Intravenous Q24H     rosuvastatin  20 mg Oral or NG Tube Daily       Data     Recent Labs  Lab  10/12/18  1040   WBC 6.8   HGB 12.5   MCV 91      INR 0.92      POTASSIUM 3.3*   CHLORIDE 106   CO2 29   BUN 16   CR 0.97   ANIONGAP 6   FELA 8.6   GLC 96   ALBUMIN 3.3*   PROTTOTAL 6.8   BILITOTAL 0.5   ALKPHOS 47   ALT 22   AST 21       Recent Results (from the past 24 hour(s))   CT Head w/o Contrast    Narrative    CT OF THE HEAD WITHOUT CONTRAST 10/12/2018 11:05 AM     COMPARISON: None.    HISTORY: Code Stroke; slurred speech.    TECHNIQUE: 5 mm thick axial CT images of the head were acquired  without IV contrast material.    FINDINGS:  There is moderate diffuse cerebral volume loss. There are  extensive confluent areas of decreased density in the cerebral white  matter bilaterally that are consistent with sequela of chronic small  vessel ischemic disease.    The ventricles and basal cisterns are within normal limits in  configuration given the degree of cerebral volume loss.  There is no  midline shift. There are no extra-axial fluid collections.    No intracranial hemorrhage, mass or recent infarct.    The visualized paranasal sinuses are well-aerated. There is no  mastoiditis. There are no fractures of the visualized bones.      Impression    IMPRESSION:  Diffuse cerebral volume loss and cerebral white matter  changes consistent with chronic small vessel ischemic disease. No  evidence for acute intracranial pathology.      Radiation dose for this scan was reduced using automated exposure  control, adjustment of the mA and/or kV according to patient size, or  iterative reconstruction technique.    YANETH BATES MD   CTA Head Neck with Contrast    Narrative    CT ANGIOGRAM OF THE HEAD AND NECK WITHOUT AND WITH CONTRAST   10/12/2018 11:06 AM     COMPARISON: None.    HISTORY: Code stroke.     TECHNIQUE:  Precontrast localizing scans were followed by CT  angiography with an injection of 70 mL Isovue-370 nonionic intravenous  contrast material with scans through the head and neck.  Images  were  transferred to a separate 3-D workstation where multiplanar  reformations and 3-D images were created.  Estimates of carotid  stenoses are made relative to the distal internal carotid artery  diameters except as noted.      FINDINGS:   Neck CTA: The common carotid arteries bilaterally are patent without  stenosis. There is calcified atherosclerotic plaque at the origins of  the internal carotid arteries on both sides that does not result in  stenosis on either side. The cervical internal carotid arteries  bilaterally are patent without stenosis. The vertebral arteries  bilaterally are patent without stenosis.    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 The left  posterior cerebral artery is supplied by the patent left posterior  communicating artery. There is calcified atherosclerotic plaque at the  intracranial distal internal carotid arteries on both sides that does  not result in stenosis on either side. The basilar, bilateral anterior  cerebral, bilateral middle cerebral and bilateral posterior cerebral  arteries are patent and unremarkable. The anterior communicating and  bilateral posterior communicating arteries are patent and  unremarkable.      Impression    IMPRESSION: Minimal calcified atherosclerotic plaque of the proximal  and distal internal carotid arteries on both sides that does not  result in any stenosis. Otherwise, normal neck and head CTA.    Radiation dose for this scan was reduced using automated exposure  control, adjustment of the mA and/or kV according to patient size, or  iterative reconstruction technique.    YANETH BATES MD   CT Head Perfusion w Contrast    Narrative    CT BRAIN PERFUSION 10/12/2018 11:10 AM    COMPARISON: None.    HISTORY: Code Stroke.     TECHNIQUE: Time sequential axial CT images of the head were acquired  during the administration of intravenous contrast (50mL  Isovue-370).  CTA images of the [place] as well as color perfusion maps of  the brain were created from this time sequential axial source data.    FINDINGS: There are no focal or regional perfusion defects in the  brain.      Impression    IMPRESSION: Normal CT perfusion of the brain.      Radiation dose for this scan was reduced using automated exposure  control, adjustment of the mA and/or kV according to patient size, or  iterative reconstruction technique.    YANETH BATES MD   MR Brain w/o Contrast    Narrative    MRI OF THE BRAIN WITHOUT CONTRAST 10/12/2018 1:04 PM     COMPARISON: Head CT same day.    HISTORY:  Aphasia.    TECHNIQUE:   Brain: Axial diffusion-weighted with ADC map, T2-weighted with fat  saturation, T1-weighted and turboFLAIR and sagittal T1-weighted images  of the brain were obtained without intravenous contrast.     FINDINGS: The study is markedly limited by patient motion.    There is moderate diffuse cerebral volume loss. There are extensive  confluent periventricular areas of abnormal T2 signal hyperintensity  in the cerebral white matter bilaterally that are consistent with  sequela of chronic small vessel ischemic disease.     The ventricles and basal cisterns are within normal limits in  configuration given the degree of cerebral volume loss.  There is no  midline shift.  There are no extra-axial fluid collections.  There is  no definite evidence for stroke or acute intracranial hemorrhage;  however, the study is markedly limited by patient motion.     There is no sinusitis or mastoiditis.       Impression    IMPRESSION:  Markedly limited study due to patient motion. Diffuse  cerebral volume loss and cerebral white matter changes consistent with  chronic small vessel ischemic disease. No definite evidence for acute  intracranial pathology.     YANETH BATES MD

## 2018-10-13 NOTE — PROGRESS NOTES
[facility]    Patient Name:  Huong Lopez  MRN:  [id number]    :  1933    Date of Admission:  10/12/2018  Date of Service:  2018    Subjective:  Huong Lopez is a 85 year old lady with hypertension who presented yesterday with dysarthria and disorientation. An empty bottle of alprazolam (which she takes for anxiety) was found in her home and UDS showed benzodiazepines. MRI was negative for infarct however the exam was severely limited due to motion artifact. CTA head/neck did not show significant stenoses. She is on aspirin 81 mg daily now (not at home). She takes lisinopril for hypertension. LDL was 131. Not on a statin at home. HbA1c was 5.2. Echocardiogram did not show intracardiac thrombus, valvular disease or atrial dilation.       24 Hour Vital Signs Summary:  Temperatures:  Current - Temp: 97.5  F (36.4  C); Max - Temp  Av.3  F (36.8  C)  Min: 97.5  F (36.4  C)  Max: 100.2  F (37.9  C)  Respiration range: Resp  Av.3  Min: 16  Max: 18  Pulse range: No Data Recorded  Blood pressure range: Systolic (24hrs), Av , Min:105 , Max:155   ; Diastolic (24hrs), Av, Min:49, Max:79    Pulse oximetry range: SpO2  Av.1 %  Min: 93 %  Max: 97 %    Physical Examination:  Physical Examination:  General:  Patient sitting in chair without evidence of distress  HEENT:  Normocephalic/atraumatic  Cardio:  Regular rate and rhythm   Pulmonary:  No respiratory distress  Extremities:  No edema  Skin:  Warm/dry     Neurologic Examination:  Mental Status: Alert and oriented to person, place and time.   Language: Speaks in full sentences. Able to name and repeat. Follows complex commands.   Speech: No dysarthria.   Cranial Nerves: Visual fields are full to confrontation. Pupils are symmetric and briskly reactive to light. Gaze is conjugate. Extraocular movements are intact. Face is symmetric with normal eye closure and smile. Hard of hearing.   Motor: Strength normal and  symmetrical in upper and lower extremities except for right deltoid weakness 4/5.   Sensory: Normal and symmetric to soft touch in the upper and lower extremities.   Cerebellar: No dysmetria on finger-to-nose and heel-knee-shin. Tremor in bilateral upper extremities, postural and kinetic.   Gait:  Stands rapidly without problems.       Assessment/Plan:  #1 Toxic-metabolic encephalopathy versus TIA/stroke    The patient presented with dysarthria and confusion. These symptoms are nonspecific and could be secondary to multiple etiologies. This likely represented an encephalopathy secondary to benzodiazepine use (possible overdose or withdrawal) and/or a urinary tract infection. We recommend that the patient stop taking benzodiazepines. In fact, the patient does not seem to understand what medications she should be taking and when. This probably needs to be supervised closer. We have asked her family to help with this.     We can not rule out a stroke, therefore we recommend aspirin 81 mg daily and continuing rosuvastatin 20 mg daily for secondary stroke prevention.     RECOMMENDATIONS:  1. Close home supervision of medications. Recommend against home-going benzodiazepines.   2. No further workup recommended. We will sign off at this time.     Thank you for the interesting consult. Please contact our service with any questions or concerns.

## 2018-10-13 NOTE — PLAN OF CARE
Problem: Patient Care Overview  Goal: Plan of Care/Patient Progress Review  Outcome: Improving  Brief note RN 3-7pm: Neuros intact, but not following all commands, no overt deficits noted.  Speech improving, up with one to BSC. Dtr Leyla at bedside, updated on POC. Request MD to update either dtr tomorrow (Carmita or Leyla).

## 2018-10-13 NOTE — PLAN OF CARE
Problem: Patient Care Overview  Goal: Plan of Care/Patient Progress Review  Speech therapy orders received, chart reviewed. Pt currently in room but having testing completed, unable to complete swallow evaluation at this time, is NPO until speech evaluation d/t dysarthria. Will check back later this date, RN notified, please page as needed [phone].

## 2018-10-13 NOTE — PLAN OF CARE
Problem: Patient Care Overview  Goal: Plan of Care/Patient Progress Review  Discharge Planner SLP   Patient plan for discharge: pt wishes to return home  Current status: Clinical swallow evaluation orders received per CVA protocol, pt NPO given presentation of dysarthria. Orders recieved for swallow evaluation but also speech evaluation if needed. Pt with decreased insight into current situation, but able to follow directions consistently. Pt denies any concerns with swallowing. Dtr present and reports significant word finding, slurred speech and confusion prior day but majority, specifically speech and language, has completely resolved and that she is back to baseline. Oromotor evaluation revealed very mild left labial asymmetry, but ROM and strength judged intact. Minimal-mild dysarthria noted, intelligibility judged 100%. Swallow evaluation completed with thin liquids, puree solids, general solids. Pt currently presents with minimal-mild dysphagia. Suspect premature spillage/delayed phayrngeal swallow initiaiton with thin liquids, timely with solids. Hyolaryngeal elevation/excursion judged reduced to palpation, but not atypical given pt's age. No overt signs/sx aspiration noted. Recommend initiate diet of general solids, thin liquids, general safe swallow precautions. Speech to follow up x1-2 more sessions, preferably with a meal and closely monitor if speech/language evaluation will be needed (currently dtr stating no as pt back to baseline).   Barriers to return to prior living situation: medical work up, cognition  Recommendations for discharge: no speech therapy barriers currently, likely will not need post-acute speech   Rationale for recommendations: Short term speech therapy given acute needs       Entered by: Harika Rosas 10/13/2018 10:14 AM

## 2018-10-13 NOTE — PLAN OF CARE
"Problem: Patient Care Overview  Goal: Plan of Care/Patient Progress Review  Pt alert to self only. Aware that her daughter \"sent the .\" Baseline dementia. VSS. Tele was sinus favio dysrhythmia. Per family pt has returned to near baseline. Confusion, slurred speech normal. Neuros stable. Incontinent of bowel and bladder. Frequent agitation for want to go home. Poor PO with food. IVF infusing. K replaced. Sitter at bedside.       "

## 2018-10-13 NOTE — PROGRESS NOTES
Clinical Swallow Evaluation:      10/13/18 1004   General Information   Onset Date 10/12/18   Start of Care Date 10/13/18   Referring Physician Sandrita Garcia MD   Patient/Family Goals Statement to return home   Swallowing Evaluation Bedside swallow evaluation   Behaviorial Observations Confused;Impulsive;Distractible   Mode of current nutrition NPO  (until speech evaluation)   Respiratory Status Room air   Comments Pt admitted to [facility] from home apartment (lives alone) with slurred speech and confusion. MRI revealed diffuse cerebral volume loss and cerebral white matter changes consistently, chronic small vessel ischemic disease, no definitive evidence of acute intracranial pathology. Nursing swallow screen failed/not completed as pt presented with slurred speech. Orders recieved for swallow evaluation but also speech evaluation if needed. Pt with decreased insight into current situation, denies any concerns. Dtr present and reports significant word finding, slurred speech and confusion prior day but majority, specifically speech and language, has completely resolved and that she is back to baseline.    Clinical Swallow Evaluation   Oral Musculature generally intact   Structural Abnormalities none present   Dentition present and adequate  (upper partials )   Mucosal Quality good   Mandibular Strength and Mobility intact   Oral Labial Strength and Mobility (mild left labial asymmetry)   Lingual Strength and Mobility WFL   Velar Elevation intact   Buccal Strength and Mobility intact   Laryngeal Function Swallow;Voicing initiated;Dry swallow palpated   Additional Documentation Yes   Clinical Swallow Eval: Thin Liquid Texture Trial   Mode of Presentation, Thin Liquids cup;straw;self-fed   Volume of Liquid or Food Presented 4 oz   Oral Phase of Swallow Premature pharyngeal entry   Pharyngeal Phase of Swallow reduction in laryngeal movement   Diagnostic Statement no overt signs/sx aspiration noted   Clinical Swallow  Eval: Puree Solid Texture Trial   Mode of Presentation, Puree spoon;self-fed   Volume of Puree Presented 2 oz   Oral Phase, Puree WFL   Pharyngeal Phase, Puree reduction in laryngeal movement   Diagnostic Statement no overt signs/sx aspiration noted   Clinical Swallow Eval: Solid Food Texture Trial   Mode of Presentation, Solid self-fed   Volume of Solid Food Presented 1 kaveh cracker   Oral Phase, Solid WFL   Pharyngeal Phase, Solid reduction in laryngeal movement   Diagnostic Statement no overt signs/sx aspriation noted   Esophageal Phase of Swallow   Patient reports or presents with symptoms of esophageal dysphagia No   Swallow Eval: Clinical Impressions   Skilled Criteria for Therapy Intervention Skilled criteria met.  Treatment indicated.   Functional Assessment Scale (FAS) 6   Treatment Diagnosis mild dysphagia   Diet texture recommendations Regular diet;Thin liquids   Therapy Frequency (x1-2 additional times, meal assessment, monitor speech)   Predicted Duration of Therapy Intervention (days/wks) 1 week   Anticipated Discharge Disposition home  (will likely not require post-acute speech, will monitor)   Risks and Benefits of Treatment have been explained. Yes   Patient, family and/or staff in agreement with Plan of Care Yes   Clinical Impression Comments Clinical swallow evaluation completed with thin liquids, puree solids, general solids. Pt currently presents with minimal-mild dysphagia. Suspect premature spillage/delayed phayrngeal swallow initiaiton with thin liquids, timely with solids. Hyolaryngeal elevation/excursion judged reduced to palpation, but not atypical given pt's age. No overt signs/sx aspiration noted.    Total Evaluation Time   Total Evaluation Time (Minutes) 25

## 2018-10-13 NOTE — PROGRESS NOTES
Patient will discharge to home via daughter. All patient belongings accounted for and sent home with patient. IV access removed. Patient educated on DC instructions and AVS. Instructed to follow-up with PCP for potassium recheck and urine culture results. Given handouts regarding antibiotics. Patient verbalized understanding and had no further questions. Meds e-prescribed to Jakub.

## 2018-10-14 LAB
BACTERIA SPEC CULT: ABNORMAL
Lab: ABNORMAL
SPECIMEN SOURCE: ABNORMAL

## 2018-10-14 NOTE — PLAN OF CARE
Problem: Patient Care Overview  Goal: Plan of Care/Patient Progress Review  Occupational Therapy Discharge Summary    Reason for therapy discharge:    Discharged to home with outpatient therapy.    Progress towards therapy goal(s). See goals on Care Plan in [facility] electronic health record for goal details.  Goals met    Therapy recommendation(s):    Continued therapy is recommended.  Rationale/Recommendations:  OP OT for cognitive evaluation .

## 2018-10-14 NOTE — PLAN OF CARE
Problem: Patient Care Overview  Goal: Plan of Care/Patient Progress Review  Speech Language Therapy Discharge Summary    Reason for therapy discharge:    Discharged to home.    Progress towards therapy goal(s). See goals on Care Plan in [facility] electronic health record for goal details.  Goals met    Therapy recommendation(s):    No further therapy is recommended. OT pursuing OP cognitive evaluation.

## 2018-10-17 LAB — INTERPRETATION ECG - MUSE: NORMAL

## 2018-10-21 NOTE — DISCHARGE SUMMARY
Admit Date:     10/12/2018   Discharge Date:     10/13/2018      DATE OF ADMISSION: 10/12/2018   DATE OF DISCHARGE: 10/13/2018      PRIMARY CARE PHYSICIAN:  Bartolo Dawkins MD      DISCHARGE DIAGNOSES:   1.  Acute ischemic stroke.   2.  Hypokalemia, resolved.   3.  History of hypertension.   4.  History of depression and anxiety.    5.  Possible mild dementia.   6.  Abnormal urinalysis with possible urinary tract infection.      DISCHARGE MEDICATIONS:   1.  Aspirin 81 mg p.o. daily.   2.  Ciprofloxacin 250 mg b.i.d. for 2 days.   3.  Potassium 10 mEq daily.   4.  Crestor 20 mg daily.      MEDICATIONS WHICH HAVE NOT CHANGED:   1.  Budesonide 3 mg capsule.  The patient takes 9 mg by mouth every morning.   2.  Calcium carbonate 500 mg p.o. b.i.d.   3.  Lisinopril 20 mg daily.   4.  Multivitamin 1 tablet daily.      The patient is to stop taking alprazolam.        FOLLOWUP: The patient will follow up with primary care provider, Dr. Bartolo Dawkins, within 7 days for hospital followup.  The following lab tests are recommended: Potassium. Primary care physician is also to follow up on urine culture which was pending at the time of discharge.  Recommend that the patient get cognitive testing to be done as an outpatient to evaluate for mild dementia.      PERTINENT LABS: The patient's labs on the day of discharge:  Sodium 142, potassium 3.3, chloride 109, bicarbonate 24, BUN 14, creatinine 0.84, calcium 8.0.  White count 5.4, hemoglobin 11.2, platelet count of 150.  UA from 10/12/2018 showed moderate leukocyte esterase, 32 WBCs, 1 RBC.  The urine culture which was pending at the time of discharge did return with greater than 100,000 colonies of E. coli.  This was pansensitive, including to Cipro.      PERTINENT IMAGING STUDIES: CTA head and neck on 10/12/2018 showed diffuse cerebral volume loss and cerebral white matter changes consistent with chronic small vessel ischemic disease, no evidence for acute intracranial pathology.   CTA of the head and neck showed minimal calcified atherosclerotic plaque in the proximal and distal internal carotid arteries on both sides  that does not result in any stenosis, otherwise normal neck and head CTA.        CT head perfusion on 10/12/2018 was normal CT perfusion of the brain.        MRI of the brain on 10/12/2018 showed markedly limited study due to patient motion, diffuse cerebral volume loss, cerebral white matter changes consistent with chronic small vessel ischemic disease.  No definite evidence for acute intracranial pathology.      CONSULTATIONS DURING THIS HOSPITALIZATION:  Neurology.      HOSPITAL COURSE: Huong Lopez is an 85-year-old female with a past medical history notable for hypertension, dyslipidemia, depression, anxiety, osteopenia, sensorineural hearing loss, and alcohol abuse in remission who presented with abnormal speech and confusion.     1.  The patient's daughter had spoken to her at 1600 hours on 10/11/2018 and called the patient again on the morning of admission, noting slurred speech.  The daughter called 911.  Upon arrival, EMS noted slurred speech and confusion.  They also found a bottle of alprazolam from 09/04/2018, which was ordered for twice a day as needed.  The workup in the ER showed U-tox positive for benzodiazepines.  CT of her head without contrast and CT angiogram of her head and neck were essentially unremarkable.  She had a brain MRI which was uninterpretable due to motion artifact.  Neurology did see the patient and they recommended aspirin 81 mg daily and Crestor for secondary stroke prevention.  By the following day when seen by the Hospitalist, the patient was back to normal.  The patient and daughter said that the patient does not take alprazolam on a regular basis and therefore this was discontinued given the patient's elderly age.    2.  Hypokalemia.  The patient initially had a low potassium of 3.3, which was replaced.  We will discharge her on 10  mEq of potassium and recheck when she is seen by her primary care physician.   3.  History of hypertension.  The patient restarted her lisinopril on discharge.   4.  Abnormal urinalysis.  The patient had no symptoms.  Her urinalysis had moderate leukocyte esterase, 32 WBCs and a few bacteria.  The patient did not have any symptoms, but she was started on Cipro and ceftriaxone, and then will be on Cipro for 2 more days to complete a 3-day course.  Again, the urine culture did come back with greater than 100,000 colonies of E. coli, which was pansensitive.   5.  Possible dementia.  Recommended to the daughter that patient get cognitive testing for mild dementia.  She is still driving and would recommend that the patient not drive.     6.  Depression, anxiety. Her prior to admission alprazolam was stopped.      CODE STATUS:  AT THIS POINT, THE PATIENT WAS FULL CODE.  WOULD RECOMMEND THAT THE PATIENT AND HER DAUGHTER DISCUSS CODE STATUS WITH HER PRIMARY CARE PHYSICIAN, BUT AT THIS POINT THE PATIENT IS FULL CODE.         AMANDO GUY MD             D: 10/20/2018   T: 10/21/2018   MT:       Name:     JULIO ZAMORA   MRN:      [id number]        Account:        [id number]   :      1933           Admit Date:     10/12/2018                                  Discharge Date: 10/13/2018      Document: [id number]       cc: Bartolo Dawkins MD